# Patient Record
(demographics unavailable — no encounter records)

---

## 2024-09-29 NOTE — HISTORY OF PRESENT ILLNESS
[de-identified] : 52 year old AJ  referred by Dr Cruz, for  evaluation s/p RIGHT nipple sparing mastectomy, R SLNB on 9/6/24 of Right ILC ER 95%, HI 90%, HER2- and LCIS;  8/9/24 ([facility]) B/L DX MMG/US: extremely dense. R 10:00 5cmfn posterior depth a new 1cm area of vague distortion corresponding to an irregular hypoechoic 9mm mass on sonogram, associated with amorphous calcifications. R 12:00 5cmfn posterior depth a new 1cm area of vague distortion corresponding to an irregular hypoechoic 11mm mass on sonogram. Distance between the 2 areas is approx 15mm. Follow Up: US-guided biopsy x2 sites right breast. BI-RADS 4, suspicious.  8/13/24 ([facility]) R US-guided biopsy x2 sites: 1. Right 12:00 5cmfn (Vision clip): Infiltrating lobular carcinoma, moderately differentiated, 11 mm in greatest dimension. ER/HI and HER2 IHC are pending. Lobular carcinoma in situ. 2. Right 10:00 5cmfn (Ring clip)- Fibrotic breast containing lobular carcinoma in situ. The results are concordant and malignant. Follow Up: surgical or oncological management.  8/19/24 ([facility]) B/L MRI: heterogeneously dense. The breast is rotated laterally. Three findings in the right breast: 1. Right breast, superior breast, projecting slightly medially (corresponds to the 12:00 (5N)(VISION)(ILC + LCIS) located on the initial mammogram and ultrasound) is clip artifact within a 18 mm area of nonmass enhancement. Biopsy showed ILC = LCIS 2. Right breast, superior breast, projecting slightly lateral (5N)(RING) there is clip artifact within a 10 mm area of nonmass enhancement. Biopsy showed LCIS. Discordant. Recommend surgical removal. 3. Right breast, superior (8N), there is 1 cm of nonmass enhancement and distortion, Sag 5-185, axial 22625-739. This area is located superior to the known LCIS and ILC. Recommend MRI biopsy. RECOMMENDATION: MR guided biopsy. BI-RADS 4B - Moderately Suspicious  8/23/24 ([facility]) R MR guided biopsy: R superior posterior 8cmfn (stoplight): Lobular carcinoma in situ (LCIS), florid and classic types. Columnar cell change. Calcifications identified in benign epithelium. Concordant. Follow Up: continued surgical management of bx-proven ILC/LCIS and LCIS/Discordant finding.  9/6/24 ([facility]) RIGHT NIPPLE SPARING MASTECTOMY: 11mm ILC with alveolar features, 4 mm from closest inked anterior margin; LCIS with associated calcifications; no lymphatic invasion is identified; at least 2 biopsy site changes; fibrocystic changes with associated calcifications; IDP RIGHT SENTINEL LYMPH NODES: One lymph node with a subcapsular lymphatic channel containing 3 isolated tumor cells RIGHT SENTINEL LYMPH NODES (0/3) SLN negative for tumor.   ORS 11       Menstrual Hx: has no history of hormone replacement treatment. age at menarche was 10. the last menstrual period was 8/13/2024. The cycles have been regular.   Prior pregnancies: G0 and P0 .   Fertility Treatments: No.   Birth Control Pill Use: No.   Breast Feeding History: N/A GYN BMD Colonoscopy   Patient denies family history of breast cancer. Denies famhx of ovarian cancer. [employer] genetic testing 8/16/24 negative for pathogenic mutations; VUS in BRIP1 gene.

## 2024-09-29 NOTE — HISTORY OF PRESENT ILLNESS
[de-identified] : 52 year old AJ  referred by Dr Cruz, for  evaluation s/p RIGHT nipple sparing mastectomy, R SLNB on 9/6/24 of Right ILC ER 95%, NE 90%, HER2- and LCIS;  8/9/24 ([facility]) B/L DX MMG/US: extremely dense. R 10:00 5cmfn posterior depth a new 1cm area of vague distortion corresponding to an irregular hypoechoic 9mm mass on sonogram, associated with amorphous calcifications. R 12:00 5cmfn posterior depth a new 1cm area of vague distortion corresponding to an irregular hypoechoic 11mm mass on sonogram. Distance between the 2 areas is approx 15mm. Follow Up: US-guided biopsy x2 sites right breast. BI-RADS 4, suspicious.  8/13/24 ([facility]) R US-guided biopsy x2 sites: 1. Right 12:00 5cmfn (Vision clip): Infiltrating lobular carcinoma, moderately differentiated, 11 mm in greatest dimension. ER/NE and HER2 IHC are pending. Lobular carcinoma in situ. 2. Right 10:00 5cmfn (Ring clip)- Fibrotic breast containing lobular carcinoma in situ. The results are concordant and malignant. Follow Up: surgical or oncological management.  8/19/24 ([facility]) B/L MRI: heterogeneously dense. The breast is rotated laterally. Three findings in the right breast: 1. Right breast, superior breast, projecting slightly medially (corresponds to the 12:00 (5N)(VISION)(ILC + LCIS) located on the initial mammogram and ultrasound) is clip artifact within a 18 mm area of nonmass enhancement. Biopsy showed ILC = LCIS 2. Right breast, superior breast, projecting slightly lateral (5N)(RING) there is clip artifact within a 10 mm area of nonmass enhancement. Biopsy showed LCIS. Discordant. Recommend surgical removal. 3. Right breast, superior (8N), there is 1 cm of nonmass enhancement and distortion, Sag 5-185, axial 42645-346. This area is located superior to the known LCIS and ILC. Recommend MRI biopsy. RECOMMENDATION: MR guided biopsy. BI-RADS 4B - Moderately Suspicious  8/23/24 ([facility]) R MR guided biopsy: R superior posterior 8cmfn (stoplight): Lobular carcinoma in situ (LCIS), florid and classic types. Columnar cell change. Calcifications identified in benign epithelium. Concordant. Follow Up: continued surgical management of bx-proven ILC/LCIS and LCIS/Discordant finding.  9/6/24 ([facility]) RIGHT NIPPLE SPARING MASTECTOMY: 11mm ILC with alveolar features, 4 mm from closest inked anterior margin; LCIS with associated calcifications; no lymphatic invasion is identified; at least 2 biopsy site changes; fibrocystic changes with associated calcifications; IDP RIGHT SENTINEL LYMPH NODES: One lymph node with a subcapsular lymphatic channel containing 3 isolated tumor cells RIGHT SENTINEL LYMPH NODES (0/3) SLN negative for tumor.   ORS 11       Menstrual Hx: has no history of hormone replacement treatment. age at menarche was 10. the last menstrual period was 8/13/2024. The cycles have been regular.   Prior pregnancies: G0 and P0 .   Fertility Treatments: No.   Birth Control Pill Use: No.   Breast Feeding History: N/A GYN BMD Colonoscopy   Patient denies family history of breast cancer. Denies famhx of ovarian cancer. [employer] genetic testing 8/16/24 negative for pathogenic mutations; VUS in BRIP1 gene.

## 2024-10-02 NOTE — HISTORY OF PRESENT ILLNESS
[FreeTextEntry1] : Tonia Soto is a 53 yo F with AJCC 8th Ed Stage IA (pT1cN0[i+]MX) RIGHT (breast quadrant: ie upper outer, lower inner) Breast ER+/AR+/Her2- breast ILC and LCIS s/p 9/6/24 RIGHT nipple sparing mastectomy, R SLNB (11mm, grade II, no LVI, margins negative,  nodes positive for isolated tumor cells, oncotype RS 11) who is referred by Dr. Cruz for discussion of radiation to the Right breast.    10/11/24: Consultations Has appt w/ Dr. Elliott on 10/8  History of Present Illness  _________________________________  Tonia Soto is a 53 yo F with no significant pmh who was found to have biopsy confirmed Right breast cancer.   8/9/24 ([facility]) B/L DX MMG/US: extremely dense. R 10:00 5cmfn posterior depth a new 1cm area of vague distortion corresponding to an irregular hypoechoic 9mm mass on sonogram, associated with amorphous calcifications. R 12:00 5cmfn posterior depth a new 1cm area of vague distortion corresponding to an irregular hypoechoic 11mm mass on sonogram. Distance between the 2 areas is approx 15mm. Follow Up: US-guided biopsy x2 sites right breast. BI-RADS 4, suspicious.   8/13/24 ([facility]) R US-guided biopsy x2 sites:  1. Right 12:00 5cmfn (Vision clip): Infiltrating lobular carcinoma, moderately differentiated, 11 mm in greatest dimension. ER/AR and HER2 IHC are pending. Lobular carcinoma in situ.  2. Right 10:00 5cmfn (Ring clip)- Fibrotic breast containing lobular carcinoma in situ.  The results are concordant and malignant. Follow Up: surgical or oncological management.   8/19/24 ([facility]) B/L MRI: heterogeneously dense. The breast is rotated laterally. Three findings in the right breast:  1. Right breast, superior breast, projecting slightly medially (corresponds to the 12:00 (5N)(VISION)(ILC + LCIS) located on the initial mammogram and ultrasound) is clip artifact within a 18 mm area of nonmass enhancement. Biopsy showed ILC = LCIS  2. Right breast, superior breast, projecting slightly lateral (5N)(RING) there is clip artifact within a 10 mm area of nonmass enhancement. Biopsy showed LCIS. Discordant. Recommend surgical removal.  3. Right breast, superior (8N), there is 1 cm of nonmass enhancement and distortion, Sag 5-185, axial 37047-393. This area is located superior to the known LCIS and ILC. Recommend MRI biopsy.  RECOMMENDATION: MR guided biopsy. BI-RADS 4B - Moderately Suspicious   8/23/24 ([facility]) R MR guided biopsy: R superior posterior 8cmfn (stoplight): Lobular carcinoma in situ (LCIS), florid and classic types. Columnar cell change. Calcifications identified in benign epithelium. Concordant. Follow Up: continued surgical management of bx-proven ILC/LCIS and LCIS/Discordant finding.   9/6/24 ([facility])  RIGHT NIPPLE SPARING MASTECTOMY: 11mm ILC with alveolar features, 4 mm from closest inked anterior margin; LCIS with associated calcifications; no lymphatic invasion is identified; at least 2 biopsy site changes; fibrocystic changes with associated calcifications; IDP  RIGHT SENTINEL LYMPH NODES: One lymph node with a subcapsular lymphatic channel containing 3 isolated tumor cells  RIGHT SENTINEL LYMPH NODES (0/3) SLN negative for tumor.

## 2024-10-02 NOTE — PAST MEDICAL HISTORY
[Menarche Age ____] : age at menarche was [unfilled] [History of Hormone Replacement Treatment] : has no history of hormone replacement treatment [Definite ___ (Date)] : the last menstrual period was [unfilled] [Regular Cycle Intervals] : have been regular [Total Preg ___] : G[unfilled] [Live Births ___] : P[unfilled]  [FreeTextEntry6] : No [FreeTextEntry7] : No [FreeTextEntry8] : N/A

## 2024-10-10 NOTE — ASSESSMENT
[FreeTextEntry1] : 51 yo AJ, My risk VUS BRIP1, perimenopause S/P Rt  NSP mastectomy  with TE and SLN -11mm ILC with alveolar features,  ER/NH pos, her 2 neg, LCIS with associated calcifications; no lymphatic invasion is identified; One lymph node with a subcapsular lymphatic channel containing 3 isolated tumor cells, (0/3) SLN negative for tumor..ORS 11 Reviewed pathology and prognosis. Reviewed ORS  no chemo benefitsThe patient is a good candidate for endocrine therapy to reduce the risk of recurrence. My recommendation is TAMOXIFEN 20 mg PO daily fsince permenopausal    We discussed potential side effects of TAMOXIFEN, including but not limited to menopause hot flashes, blood clots and endometrial cancer, cataracts, improvement BMD and chol. Will consider change to  when in menopause  The patient understands the potential benefits, risks and alternatives. She agrees to take tamoxifen in the adjuvant setting.     Discussed risk distant recurrence, local and contralateral breast ca. Discussed finding of 3 cells in one SLN - possible "traumet". Would not change treatment plan. Baseline pelvic US and BMD. Reviewed diet and exercise All of the patient's questions were adequately addressed and answered during today's consultation. Our discussion covered a range of topics including diagnosis, prognosis, treatment options, potential risks and alternatives, as well as overall care. The patient expressed agreement with the proposed plan. She was instructed to reach out to me if she had any further questions or if there were any changes in her clinical condition.

## 2024-10-10 NOTE — PHYSICAL EXAM
[Restricted in physically strenuous activity but ambulatory and able to carry out work of a light or sedentary nature] : Status 1- Restricted in physically strenuous activity but ambulatory and able to carry out work of a light or sedentary nature, e.g., light house work, office work [Normal] : affect appropriate [de-identified] : s/p Rt NSP mastectomy with TE, Lt unremarkable - no palp LN

## 2024-10-10 NOTE — PHYSICAL EXAM
[Restricted in physically strenuous activity but ambulatory and able to carry out work of a light or sedentary nature] : Status 1- Restricted in physically strenuous activity but ambulatory and able to carry out work of a light or sedentary nature, e.g., light house work, office work [Normal] : affect appropriate [de-identified] : s/p Rt NSP mastectomy with TE, Lt unremarkable - no palp LN

## 2024-10-10 NOTE — ASSESSMENT
[FreeTextEntry1] : 51 yo AJ, My risk VUS BRIP1, perimenopause S/P Rt  NSP mastectomy  with TE and SLN -11mm ILC with alveolar features,  ER/ME pos, her 2 neg, LCIS with associated calcifications; no lymphatic invasion is identified; One lymph node with a subcapsular lymphatic channel containing 3 isolated tumor cells, (0/3) SLN negative for tumor..ORS 11 Reviewed pathology and prognosis. Reviewed ORS  no chemo benefitsThe patient is a good candidate for endocrine therapy to reduce the risk of recurrence. My recommendation is TAMOXIFEN 20 mg PO daily fsince permenopausal    We discussed potential side effects of TAMOXIFEN, including but not limited to menopause hot flashes, blood clots and endometrial cancer, cataracts, improvement BMD and chol. Will consider change to  when in menopause  The patient understands the potential benefits, risks and alternatives. She agrees to take tamoxifen in the adjuvant setting.     Discussed risk distant recurrence, local and contralateral breast ca. Discussed finding of 3 cells in one SLN - possible "traumet". Would not change treatment plan. Baseline pelvic US and BMD. Reviewed diet and exercise All of the patient's questions were adequately addressed and answered during today's consultation. Our discussion covered a range of topics including diagnosis, prognosis, treatment options, potential risks and alternatives, as well as overall care. The patient expressed agreement with the proposed plan. She was instructed to reach out to me if she had any further questions or if there were any changes in her clinical condition.

## 2024-10-10 NOTE — HISTORY OF PRESENT ILLNESS
[de-identified] : 52 year old AJ  referred by Dr Cruz, for  evaluation s/p RIGHT nipple sparing mastectomy, R SLNB on 9/6/24 of Right ILC ER 95%, MI 90%, HER2- and LCIS; Presents with siter Zainab  8/9/24 ([facility]) B/L DX MMG/US: extremely dense. R 10:00 5cmfn posterior depth a new 1cm area of vague distortion corresponding to an irregular hypoechoic 9mm mass on sonogram, associated with amorphous calcifications. R 12:00 5cmfn posterior depth a new 1cm area of vague distortion corresponding to an irregular hypoechoic 11mm mass on sonogram. Distance between the 2 areas is approx 15mm. Follow Up: US-guided biopsy x2 sites right breast. BI-RADS 4, suspicious.  8/13/24 ([facility]) R US-guided biopsy x2 sites: 1. Right 12:00 5cmfn (Vision clip): Infiltrating lobular carcinoma, moderately differentiated, 11 mm in greatest dimension. ER/MI and HER2 IHC are pending. Lobular carcinoma in situ. 2. Right 10:00 5cmfn (Ring clip)- Fibrotic breast containing lobular carcinoma in situ. The results are concordant and malignant. Follow Up: surgical or oncological management.  ER 95%, MI 95%, her 2 neg  8/19/24 ([facility]) B/L MRI: heterogeneously dense. The breast is rotated laterally. Three findings in the right breast: 1. Right breast, superior breast, projecting slightly medially (corresponds to the 12:00 (5N)(VISION)(ILC + LCIS) located on the initial mammogram and ultrasound) is clip artifact within a 18 mm area of nonmass enhancement. Biopsy showed ILC = LCIS 2. Right breast, superior breast, projecting slightly lateral (5N)(RING) there is clip artifact within a 10 mm area of nonmass enhancement. Biopsy showed LCIS. Discordant. Recommend surgical removal. 3. Right breast, superior (8N), there is 1 cm of nonmass enhancement and distortion, Sag 5-185, axial 77576-922. This area is located superior to the known LCIS and ILC. Recommend MRI biopsy. RECOMMENDATION: MR guided biopsy. BI-RADS 4B - Moderately Suspicious  8/23/24 ([facility]) R MR guided biopsy: R superior posterior 8cmfn (stoplight): Lobular carcinoma in situ (LCIS), florid and classic types. Columnar cell change. Calcifications identified in benign epithelium. Concordant. Follow Up: continued surgical management of bx-proven ILC/LCIS and LCIS/Discordant finding.  9/6/24 ([facility]) RIGHT NIPPLE SPARING MASTECTOMY: 11mm ILC with alveolar features, 4 mm from closest inked anterior margin; LCIS with associated calcifications; no lymphatic invasion is identified; at least 2 biopsy site changes; fibrocystic changes with associated calcifications; IDP RIGHT SENTINEL LYMPH NODES: One lymph node with a subcapsular lymphatic channel containing 3 isolated tumor cells RIGHT SENTINEL LYMPH NODES (0/3) SLN negative for tumor.  T1cN0 (i+) ORS 11   Menstrual Hx: has no history of hormone replacement treatment. age at menarche was 10-11. LMP 9/10/24. The cycles have been regular.   Prior pregnancies: G0 and P0 .   Fertility Treatments: No.   Birth Control Pill Use: 16-47.   Breast Feeding History: N/A GYN 9/24/24 BMD never Colonoscopy never No significant med illness age 4 adenoids Lipoma thing L 7/23   Patient denies family history of breast cancer. Denies fam hx of ovarian cancer, prostate, pancreatic. Father AML 63 [employer] genetic testing 8/16/24 negative for pathogenic mutations; VUS in BRIP1 gene.  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 Occ Etoh, not sexually active, exercises regularly

## 2024-10-10 NOTE — ASSESSMENT
[FreeTextEntry1] : 51 yo AJ, My risk VUS BRIP1, perimenopause S/P Rt  NSP mastectomy  with TE and SLN -11mm ILC with alveolar features,  ER/MA pos, her 2 neg, LCIS with associated calcifications; no lymphatic invasion is identified; One lymph node with a subcapsular lymphatic channel containing 3 isolated tumor cells, (0/3) SLN negative for tumor..ORS 11 Reviewed pathology and prognosis. Reviewed ORS  no chemo benefitsThe patient is a good candidate for endocrine therapy to reduce the risk of recurrence. My recommendation is TAMOXIFEN 20 mg PO daily fsince permenopausal    We discussed potential side effects of TAMOXIFEN, including but not limited to menopause hot flashes, blood clots and endometrial cancer, cataracts, improvement BMD and chol. Will consider change to  when in menopause  The patient understands the potential benefits, risks and alternatives. She agrees to take tamoxifen in the adjuvant setting.     Discussed risk distant recurrence, local and contralateral breast ca. Discussed finding of 3 cells in one SLN - possible "traumet". Would not change treatment plan. Baseline pelvic US and BMD. Reviewed diet and exercise All of the patient's questions were adequately addressed and answered during today's consultation. Our discussion covered a range of topics including diagnosis, prognosis, treatment options, potential risks and alternatives, as well as overall care. The patient expressed agreement with the proposed plan. She was instructed to reach out to me if she had any further questions or if there were any changes in her clinical condition.

## 2024-10-10 NOTE — PHYSICAL EXAM
[Restricted in physically strenuous activity but ambulatory and able to carry out work of a light or sedentary nature] : Status 1- Restricted in physically strenuous activity but ambulatory and able to carry out work of a light or sedentary nature, e.g., light house work, office work [Normal] : affect appropriate [de-identified] : s/p Rt NSP mastectomy with TE, Lt unremarkable - no palp LN

## 2024-10-10 NOTE — HISTORY OF PRESENT ILLNESS
[de-identified] : 52 year old AJ  referred by Dr Cruz, for  evaluation s/p RIGHT nipple sparing mastectomy, R SLNB on 9/6/24 of Right ILC ER 95%, MD 90%, HER2- and LCIS; Presents with siter Zainab  8/9/24 ([facility]) B/L DX MMG/US: extremely dense. R 10:00 5cmfn posterior depth a new 1cm area of vague distortion corresponding to an irregular hypoechoic 9mm mass on sonogram, associated with amorphous calcifications. R 12:00 5cmfn posterior depth a new 1cm area of vague distortion corresponding to an irregular hypoechoic 11mm mass on sonogram. Distance between the 2 areas is approx 15mm. Follow Up: US-guided biopsy x2 sites right breast. BI-RADS 4, suspicious.  8/13/24 ([facility]) R US-guided biopsy x2 sites: 1. Right 12:00 5cmfn (Vision clip): Infiltrating lobular carcinoma, moderately differentiated, 11 mm in greatest dimension. ER/MD and HER2 IHC are pending. Lobular carcinoma in situ. 2. Right 10:00 5cmfn (Ring clip)- Fibrotic breast containing lobular carcinoma in situ. The results are concordant and malignant. Follow Up: surgical or oncological management.  ER 95%, MD 95%, her 2 neg  8/19/24 ([facility]) B/L MRI: heterogeneously dense. The breast is rotated laterally. Three findings in the right breast: 1. Right breast, superior breast, projecting slightly medially (corresponds to the 12:00 (5N)(VISION)(ILC + LCIS) located on the initial mammogram and ultrasound) is clip artifact within a 18 mm area of nonmass enhancement. Biopsy showed ILC = LCIS 2. Right breast, superior breast, projecting slightly lateral (5N)(RING) there is clip artifact within a 10 mm area of nonmass enhancement. Biopsy showed LCIS. Discordant. Recommend surgical removal. 3. Right breast, superior (8N), there is 1 cm of nonmass enhancement and distortion, Sag 5-185, axial 65956-766. This area is located superior to the known LCIS and ILC. Recommend MRI biopsy. RECOMMENDATION: MR guided biopsy. BI-RADS 4B - Moderately Suspicious  8/23/24 ([facility]) R MR guided biopsy: R superior posterior 8cmfn (stoplight): Lobular carcinoma in situ (LCIS), florid and classic types. Columnar cell change. Calcifications identified in benign epithelium. Concordant. Follow Up: continued surgical management of bx-proven ILC/LCIS and LCIS/Discordant finding.  9/6/24 ([facility]) RIGHT NIPPLE SPARING MASTECTOMY: 11mm ILC with alveolar features, 4 mm from closest inked anterior margin; LCIS with associated calcifications; no lymphatic invasion is identified; at least 2 biopsy site changes; fibrocystic changes with associated calcifications; IDP RIGHT SENTINEL LYMPH NODES: One lymph node with a subcapsular lymphatic channel containing 3 isolated tumor cells RIGHT SENTINEL LYMPH NODES (0/3) SLN negative for tumor.  T1cN0 (i+) ORS 11   Menstrual Hx: has no history of hormone replacement treatment. age at menarche was 10-11. LMP 9/10/24. The cycles have been regular.   Prior pregnancies: G0 and P0 .   Fertility Treatments: No.   Birth Control Pill Use: 16-47.   Breast Feeding History: N/A GYN 9/24/24 BMD never Colonoscopy never No significant med illness age 4 adenoids Lipoma thing L 7/23   Patient denies family history of breast cancer. Denies fam hx of ovarian cancer, prostate, pancreatic. Father AML 63 [employer] genetic testing 8/16/24 negative for pathogenic mutations; VUS in BRIP1 gene.  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 Occ Etoh, not sexually active, exercises regularly

## 2024-10-10 NOTE — HISTORY OF PRESENT ILLNESS
[de-identified] : 52 year old AJ  referred by Dr Cruz, for  evaluation s/p RIGHT nipple sparing mastectomy, R SLNB on 9/6/24 of Right ILC ER 95%, ID 90%, HER2- and LCIS; Presents with siter Zainab  8/9/24 ([facility]) B/L DX MMG/US: extremely dense. R 10:00 5cmfn posterior depth a new 1cm area of vague distortion corresponding to an irregular hypoechoic 9mm mass on sonogram, associated with amorphous calcifications. R 12:00 5cmfn posterior depth a new 1cm area of vague distortion corresponding to an irregular hypoechoic 11mm mass on sonogram. Distance between the 2 areas is approx 15mm. Follow Up: US-guided biopsy x2 sites right breast. BI-RADS 4, suspicious.  8/13/24 ([facility]) R US-guided biopsy x2 sites: 1. Right 12:00 5cmfn (Vision clip): Infiltrating lobular carcinoma, moderately differentiated, 11 mm in greatest dimension. ER/ID and HER2 IHC are pending. Lobular carcinoma in situ. 2. Right 10:00 5cmfn (Ring clip)- Fibrotic breast containing lobular carcinoma in situ. The results are concordant and malignant. Follow Up: surgical or oncological management.  ER 95%, ID 95%, her 2 neg  8/19/24 ([facility]) B/L MRI: heterogeneously dense. The breast is rotated laterally. Three findings in the right breast: 1. Right breast, superior breast, projecting slightly medially (corresponds to the 12:00 (5N)(VISION)(ILC + LCIS) located on the initial mammogram and ultrasound) is clip artifact within a 18 mm area of nonmass enhancement. Biopsy showed ILC = LCIS 2. Right breast, superior breast, projecting slightly lateral (5N)(RING) there is clip artifact within a 10 mm area of nonmass enhancement. Biopsy showed LCIS. Discordant. Recommend surgical removal. 3. Right breast, superior (8N), there is 1 cm of nonmass enhancement and distortion, Sag 5-185, axial 07032-232. This area is located superior to the known LCIS and ILC. Recommend MRI biopsy. RECOMMENDATION: MR guided biopsy. BI-RADS 4B - Moderately Suspicious  8/23/24 ([facility]) R MR guided biopsy: R superior posterior 8cmfn (stoplight): Lobular carcinoma in situ (LCIS), florid and classic types. Columnar cell change. Calcifications identified in benign epithelium. Concordant. Follow Up: continued surgical management of bx-proven ILC/LCIS and LCIS/Discordant finding.  9/6/24 ([facility]) RIGHT NIPPLE SPARING MASTECTOMY: 11mm ILC with alveolar features, 4 mm from closest inked anterior margin; LCIS with associated calcifications; no lymphatic invasion is identified; at least 2 biopsy site changes; fibrocystic changes with associated calcifications; IDP RIGHT SENTINEL LYMPH NODES: One lymph node with a subcapsular lymphatic channel containing 3 isolated tumor cells RIGHT SENTINEL LYMPH NODES (0/3) SLN negative for tumor.  T1cN0 (i+) ORS 11   Menstrual Hx: has no history of hormone replacement treatment. age at menarche was 10-11. LMP 9/10/24. The cycles have been regular.   Prior pregnancies: G0 and P0 .   Fertility Treatments: No.   Birth Control Pill Use: 16-47.   Breast Feeding History: N/A GYN 9/24/24 BMD never Colonoscopy never No significant med illness age 4 adenoids Lipoma thing L 7/23   Patient denies family history of breast cancer. Denies fam hx of ovarian cancer, prostate, pancreatic. Father AML 63 [facility] genetic testing 8/16/24 negative for pathogenic mutations; VUS in BRIP1 gene.  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 Occ Etoh, not sexually active, exercises regularly

## 2024-10-10 NOTE — HISTORY OF PRESENT ILLNESS
[de-identified] : 52 year old AJ  referred by Dr Cruz, for  evaluation s/p RIGHT nipple sparing mastectomy, R SLNB on 9/6/24 of Right ILC ER 95%, OR 90%, HER2- and LCIS; Presents with siter Zainab  8/9/24 ([facility]) B/L DX MMG/US: extremely dense. R 10:00 5cmfn posterior depth a new 1cm area of vague distortion corresponding to an irregular hypoechoic 9mm mass on sonogram, associated with amorphous calcifications. R 12:00 5cmfn posterior depth a new 1cm area of vague distortion corresponding to an irregular hypoechoic 11mm mass on sonogram. Distance between the 2 areas is approx 15mm. Follow Up: US-guided biopsy x2 sites right breast. BI-RADS 4, suspicious.  8/13/24 ([facility]) R US-guided biopsy x2 sites: 1. Right 12:00 5cmfn (Vision clip): Infiltrating lobular carcinoma, moderately differentiated, 11 mm in greatest dimension. ER/OR and HER2 IHC are pending. Lobular carcinoma in situ. 2. Right 10:00 5cmfn (Ring clip)- Fibrotic breast containing lobular carcinoma in situ. The results are concordant and malignant. Follow Up: surgical or oncological management.  ER 95%, OR 95%, her 2 neg  8/19/24 ([facility]) B/L MRI: heterogeneously dense. The breast is rotated laterally. Three findings in the right breast: 1. Right breast, superior breast, projecting slightly medially (corresponds to the 12:00 (5N)(VISION)(ILC + LCIS) located on the initial mammogram and ultrasound) is clip artifact within a 18 mm area of nonmass enhancement. Biopsy showed ILC = LCIS 2. Right breast, superior breast, projecting slightly lateral (5N)(RING) there is clip artifact within a 10 mm area of nonmass enhancement. Biopsy showed LCIS. Discordant. Recommend surgical removal. 3. Right breast, superior (8N), there is 1 cm of nonmass enhancement and distortion, Sag 5-185, axial 13623-623. This area is located superior to the known LCIS and ILC. Recommend MRI biopsy. RECOMMENDATION: MR guided biopsy. BI-RADS 4B - Moderately Suspicious  8/23/24 ([facility]) R MR guided biopsy: R superior posterior 8cmfn (stoplight): Lobular carcinoma in situ (LCIS), florid and classic types. Columnar cell change. Calcifications identified in benign epithelium. Concordant. Follow Up: continued surgical management of bx-proven ILC/LCIS and LCIS/Discordant finding.  9/6/24 ([facility]) RIGHT NIPPLE SPARING MASTECTOMY: 11mm ILC with alveolar features, 4 mm from closest inked anterior margin; LCIS with associated calcifications; no lymphatic invasion is identified; at least 2 biopsy site changes; fibrocystic changes with associated calcifications; IDP RIGHT SENTINEL LYMPH NODES: One lymph node with a subcapsular lymphatic channel containing 3 isolated tumor cells RIGHT SENTINEL LYMPH NODES (0/3) SLN negative for tumor.  T1cN0 (i+) ORS 11   Menstrual Hx: has no history of hormone replacement treatment. age at menarche was 10-11. LMP 9/10/24. The cycles have been regular.   Prior pregnancies: G0 and P0 .   Fertility Treatments: No.   Birth Control Pill Use: 16-47.   Breast Feeding History: N/A GYN 9/24/24 BMD never Colonoscopy never No significant med illness age 4 adenoids Lipoma thing L 7/23   Patient denies family history of breast cancer. Denies fam hx of ovarian cancer, prostate, pancreatic. Father AML 63 [facility] MD genetic testing 8/16/24 negative for pathogenic mutations; VUS in BRIP1 gene.  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 Occ Etoh, not sexually active, exercises regularly

## 2024-10-10 NOTE — PHYSICAL EXAM
[Restricted in physically strenuous activity but ambulatory and able to carry out work of a light or sedentary nature] : Status 1- Restricted in physically strenuous activity but ambulatory and able to carry out work of a light or sedentary nature, e.g., light house work, office work [Normal] : affect appropriate [de-identified] : s/p Rt NSP mastectomy with TE, Lt unremarkable - no palp LN

## 2024-10-10 NOTE — ASSESSMENT
[FreeTextEntry1] : 51 yo AJ, My risk VUS BRIP1, perimenopause S/P Rt  NSP mastectomy  with TE and SLN -11mm ILC with alveolar features,  ER/HI pos, her 2 neg, LCIS with associated calcifications; no lymphatic invasion is identified; One lymph node with a subcapsular lymphatic channel containing 3 isolated tumor cells, (0/3) SLN negative for tumor..ORS 11 Reviewed pathology and prognosis. Reviewed ORS  no chemo benefitsThe patient is a good candidate for endocrine therapy to reduce the risk of recurrence. My recommendation is TAMOXIFEN 20 mg PO daily fsince permenopausal    We discussed potential side effects of TAMOXIFEN, including but not limited to menopause hot flashes, blood clots and endometrial cancer, cataracts, improvement BMD and chol. Will consider change to  when in menopause  The patient understands the potential benefits, risks and alternatives. She agrees to take tamoxifen in the adjuvant setting.     Discussed risk distant recurrence, local and contralateral breast ca. Discussed finding of 3 cells in one SLN - possible "traumet". Would not change treatment plan. Baseline pelvic US and BMD. Reviewed diet and exercise All of the patient's questions were adequately addressed and answered during today's consultation. Our discussion covered a range of topics including diagnosis, prognosis, treatment options, potential risks and alternatives, as well as overall care. The patient expressed agreement with the proposed plan. She was instructed to reach out to me if she had any further questions or if there were any changes in her clinical condition.

## 2024-10-17 NOTE — HISTORY OF PRESENT ILLNESS
[FreeTextEntry1] : Tonia Soto is a 51 yo F with AJCC 8th Ed Stage IA (pT1cN0[i+]MX) RIGHT UOQ Breast ER+/TN+/Her2- breast ILC and LCIS s/p 9/6/24 RIGHT nipple sparing mastectomy, R SLNB (11mm, grade II, no LVI, margins negative, nodes positive for isolated tumor cells, oncotype RS 11) who is referred by Dr. Cruz for discussion of radiation to the Right breast.    10/11/24: Consultation. Today she is doing well with no complaints. Has appt w/ Dr. Elliott on 10/8.  History of Present Illness  _________________________________  Tonia Soto is a 51 yo F with no significant pmh who was found to have biopsy confirmed Right breast cancer.   8/9/24 ([facility]) B/L DX MMG/US: extremely dense. R 10:00 5cmfn posterior depth a new 1cm area of vague distortion corresponding to an irregular hypoechoic 9mm mass on sonogram, associated with amorphous calcifications. R 12:00 5cmfn posterior depth a new 1cm area of vague distortion corresponding to an irregular hypoechoic 11mm mass on sonogram. Distance between the 2 areas is approx 15mm. Follow Up: US-guided biopsy x2 sites right breast. BI-RADS 4, suspicious.   8/13/24 ([facility]) R US-guided biopsy x2 sites:  1. Right 12:00 5cmfn (Vision clip): Infiltrating lobular carcinoma, moderately differentiated, 11 mm in greatest dimension. ER/TN and HER2 IHC are pending. Lobular carcinoma in situ.  2. Right 10:00 5cmfn (Ring clip)- Fibrotic breast containing lobular carcinoma in situ.  The results are concordant and malignant. Follow Up: surgical or oncological management.   8/19/24 ([facility]) B/L MRI: heterogeneously dense. The breast is rotated laterally. Three findings in the right breast:  1. Right breast, superior breast, projecting slightly medially (corresponds to the 12:00 (5N)(VISION)(ILC + LCIS) located on the initial mammogram and ultrasound) is clip artifact within a 18 mm area of nonmass enhancement. Biopsy showed ILC = LCIS  2. Right breast, superior breast, projecting slightly lateral (5N)(RING) there is clip artifact within a 10 mm area of nonmass enhancement. Biopsy showed LCIS. Discordant. Recommend surgical removal.  3. Right breast, superior (8N), there is 1 cm of nonmass enhancement and distortion, Sag 5-185, axial 87278-700. This area is located superior to the known LCIS and ILC. Recommend MRI biopsy.  RECOMMENDATION: MR guided biopsy. BI-RADS 4B - Moderately Suspicious   8/23/24 ([facility]) R MR guided biopsy: R superior posterior 8cmfn (stoplight): Lobular carcinoma in situ (LCIS), florid and classic types. Columnar cell change. Calcifications identified in benign epithelium. Concordant. Follow Up: continued surgical management of bx-proven ILC/LCIS and LCIS/Discordant finding.   9/6/24 ([facility])  RIGHT NIPPLE SPARING MASTECTOMY: 11mm ILC with alveolar features, 4 mm from closest inked anterior margin; LCIS with associated calcifications; no lymphatic invasion is identified; at least 2 biopsy site changes; fibrocystic changes with associated calcifications; IDP  RIGHT SENTINEL LYMPH NODES: One lymph node with a subcapsular lymphatic channel containing 3 isolated tumor cells  RIGHT SENTINEL LYMPH NODES (0/3) SLN negative for tumor.

## 2024-10-17 NOTE — HISTORY OF PRESENT ILLNESS
[FreeTextEntry1] : Tonia Soto is a 53 yo F with AJCC 8th Ed Stage IA (pT1cN0[i+]MX) RIGHT UOQ Breast ER+/CO+/Her2- breast ILC and LCIS s/p 9/6/24 RIGHT nipple sparing mastectomy, R SLNB (11mm, grade II, no LVI, margins negative, nodes positive for isolated tumor cells, oncotype RS 11) who is referred by Dr. Cruz for discussion of radiation to the Right breast.    10/11/24: Consultation. Today she is doing well with no complaints. Has appt w/ Dr. Elliott on 10/8.  History of Present Illness  _________________________________  Tonia Soto is a 53 yo F with no significant pmh who was found to have biopsy confirmed Right breast cancer.   8/9/24 ([facility]) B/L DX MMG/US: extremely dense. R 10:00 5cmfn posterior depth a new 1cm area of vague distortion corresponding to an irregular hypoechoic 9mm mass on sonogram, associated with amorphous calcifications. R 12:00 5cmfn posterior depth a new 1cm area of vague distortion corresponding to an irregular hypoechoic 11mm mass on sonogram. Distance between the 2 areas is approx 15mm. Follow Up: US-guided biopsy x2 sites right breast. BI-RADS 4, suspicious.   8/13/24 ([facility]) R US-guided biopsy x2 sites:  1. Right 12:00 5cmfn (Vision clip): Infiltrating lobular carcinoma, moderately differentiated, 11 mm in greatest dimension. ER/CO and HER2 IHC are pending. Lobular carcinoma in situ.  2. Right 10:00 5cmfn (Ring clip)- Fibrotic breast containing lobular carcinoma in situ.  The results are concordant and malignant. Follow Up: surgical or oncological management.   8/19/24 ([facility]) B/L MRI: heterogeneously dense. The breast is rotated laterally. Three findings in the right breast:  1. Right breast, superior breast, projecting slightly medially (corresponds to the 12:00 (5N)(VISION)(ILC + LCIS) located on the initial mammogram and ultrasound) is clip artifact within a 18 mm area of nonmass enhancement. Biopsy showed ILC = LCIS  2. Right breast, superior breast, projecting slightly lateral (5N)(RING) there is clip artifact within a 10 mm area of nonmass enhancement. Biopsy showed LCIS. Discordant. Recommend surgical removal.  3. Right breast, superior (8N), there is 1 cm of nonmass enhancement and distortion, Sag 5-185, axial 52841-493. This area is located superior to the known LCIS and ILC. Recommend MRI biopsy.  RECOMMENDATION: MR guided biopsy. BI-RADS 4B - Moderately Suspicious   8/23/24 ([facility]) R MR guided biopsy: R superior posterior 8cmfn (stoplight): Lobular carcinoma in situ (LCIS), florid and classic types. Columnar cell change. Calcifications identified in benign epithelium. Concordant. Follow Up: continued surgical management of bx-proven ILC/LCIS and LCIS/Discordant finding.   9/6/24 ([facility])  RIGHT NIPPLE SPARING MASTECTOMY: 11mm ILC with alveolar features, 4 mm from closest inked anterior margin; LCIS with associated calcifications; no lymphatic invasion is identified; at least 2 biopsy site changes; fibrocystic changes with associated calcifications; IDP  RIGHT SENTINEL LYMPH NODES: One lymph node with a subcapsular lymphatic channel containing 3 isolated tumor cells  RIGHT SENTINEL LYMPH NODES (0/3) SLN negative for tumor.

## 2024-10-17 NOTE — PHYSICAL EXAM
[Supraclavicular Lymph Nodes Enlarged Bilaterally] : supraclavicular [Axillary Lymph Nodes Enlarged Bilaterally] : axillary [de-identified] : Well-healed mastectomy. No masses or lesions

## 2024-10-17 NOTE — VITALS
[Date: ____________] : Patient's last distress assessment performed on [unfilled]. [Maximal Pain Intensity: 0/10] : 0/10 [Least Pain Intensity: 0/10] : 0/10 [90: Able to carry normal activity; minor signs or symptoms of disease.] : 90: Able to carry normal activity; minor signs or symptoms of disease.  [ECOG Performance Status: 0 - Fully active, able to carry on all pre-disease performance without restriction] : Performance Status: 0 - Fully active, able to carry on all pre-disease performance without restriction [3 - Distress Level] : Distress Level: 3

## 2024-10-17 NOTE — PHYSICAL EXAM
[Supraclavicular Lymph Nodes Enlarged Bilaterally] : supraclavicular [Axillary Lymph Nodes Enlarged Bilaterally] : axillary [de-identified] : Well-healed mastectomy. No masses or lesions

## 2024-10-17 NOTE — PAST MEDICAL HISTORY
[History of Hormone Replacement Treatment] : has no history of hormone replacement treatment [FreeTextEntry6] : No [FreeTextEntry7] : No [FreeTextEntry8] : N/A

## 2024-10-17 NOTE — HISTORY OF PRESENT ILLNESS
[FreeTextEntry1] : Tonia Soto is a 51 yo F with AJCC 8th Ed Stage IA (pT1cN0[i+]MX) RIGHT UOQ Breast ER+/NH+/Her2- breast ILC and LCIS s/p 9/6/24 RIGHT nipple sparing mastectomy, R SLNB (11mm, grade II, no LVI, margins negative, nodes positive for isolated tumor cells, oncotype RS 11) who is referred by Dr. Cruz for discussion of radiation to the Right breast.    10/11/24: Consultation. Today she is doing well with no complaints. Has appt w/ Dr. Elliott on 10/8.  History of Present Illness  _________________________________  Tonia Soto is a 51 yo F with no significant pmh who was found to have biopsy confirmed Right breast cancer.   8/9/24 ([facility]) B/L DX MMG/US: extremely dense. R 10:00 5cmfn posterior depth a new 1cm area of vague distortion corresponding to an irregular hypoechoic 9mm mass on sonogram, associated with amorphous calcifications. R 12:00 5cmfn posterior depth a new 1cm area of vague distortion corresponding to an irregular hypoechoic 11mm mass on sonogram. Distance between the 2 areas is approx 15mm. Follow Up: US-guided biopsy x2 sites right breast. BI-RADS 4, suspicious.   8/13/24 ([facility]) R US-guided biopsy x2 sites:  1. Right 12:00 5cmfn (Vision clip): Infiltrating lobular carcinoma, moderately differentiated, 11 mm in greatest dimension. ER/NH and HER2 IHC are pending. Lobular carcinoma in situ.  2. Right 10:00 5cmfn (Ring clip)- Fibrotic breast containing lobular carcinoma in situ.  The results are concordant and malignant. Follow Up: surgical or oncological management.   8/19/24 ([facility]) B/L MRI: heterogeneously dense. The breast is rotated laterally. Three findings in the right breast:  1. Right breast, superior breast, projecting slightly medially (corresponds to the 12:00 (5N)(VISION)(ILC + LCIS) located on the initial mammogram and ultrasound) is clip artifact within a 18 mm area of nonmass enhancement. Biopsy showed ILC = LCIS  2. Right breast, superior breast, projecting slightly lateral (5N)(RING) there is clip artifact within a 10 mm area of nonmass enhancement. Biopsy showed LCIS. Discordant. Recommend surgical removal.  3. Right breast, superior (8N), there is 1 cm of nonmass enhancement and distortion, Sag 5-185, axial 36431-190. This area is located superior to the known LCIS and ILC. Recommend MRI biopsy.  RECOMMENDATION: MR guided biopsy. BI-RADS 4B - Moderately Suspicious   8/23/24 ([facility]) R MR guided biopsy: R superior posterior 8cmfn (stoplight): Lobular carcinoma in situ (LCIS), florid and classic types. Columnar cell change. Calcifications identified in benign epithelium. Concordant. Follow Up: continued surgical management of bx-proven ILC/LCIS and LCIS/Discordant finding.   9/6/24 ([facility])  RIGHT NIPPLE SPARING MASTECTOMY: 11mm ILC with alveolar features, 4 mm from closest inked anterior margin; LCIS with associated calcifications; no lymphatic invasion is identified; at least 2 biopsy site changes; fibrocystic changes with associated calcifications; IDP  RIGHT SENTINEL LYMPH NODES: One lymph node with a subcapsular lymphatic channel containing 3 isolated tumor cells  RIGHT SENTINEL LYMPH NODES (0/3) SLN negative for tumor.

## 2024-10-17 NOTE — PHYSICAL EXAM
[Supraclavicular Lymph Nodes Enlarged Bilaterally] : supraclavicular [Axillary Lymph Nodes Enlarged Bilaterally] : axillary [de-identified] : Well-healed mastectomy. No masses or lesions

## 2024-10-17 NOTE — DISEASE MANAGEMENT
[Clinical] : TNM Stage: c [TTNM] : 1c [NTNM] : 0i+ [MTNM] : 0 [IA] : IA Apixaban/Eliquis - Compliance/Apixaban/Eliquis - Dietary Advice/Apixaban/Eliquis - Follow up monitoring/Apixaban/Eliquis - Potential for adverse drug reactions and interactions

## 2024-10-20 NOTE — PHYSICAL EXAM
[de-identified] : TE in good position, Incisions well healed, no collections or s/sx of infection

## 2024-10-20 NOTE — HISTORY OF PRESENT ILLNESS
[FreeTextEntry1] : 53 y/o female s/p right breast reconstruction with sub muscular T/E with Dr. Cruz on 09/06/2024. Denies any f/c/n/v. Patient is not taking any pain medication. Patient is applying Aquaphor to drain site. She has an appointment with heme onc today and rad onc next on 10/11. Has tumor cells involving the LN capsule but not the node proper - and will be consulting /discussing if XRT is necessary. She was seeing Nicolette for PT, but unable to continue due to insurance.

## 2024-10-20 NOTE — PHYSICAL EXAM
[de-identified] : TE in good position, Incisions well healed, no collections or s/sx of infection

## 2024-10-20 NOTE — PHYSICAL EXAM
[de-identified] : TE in good position, Incisions well healed, no collections or s/sx of infection

## 2024-10-20 NOTE — ASSESSMENT
[FreeTextEntry1] : 1 month right TE breast recon, doing well TE: 200/300 Continue sports bra Continue PT, referral to Dr. Livingston given  RTC in 1 month unless radiation is needed, if so, she will return next week to finish expansion

## 2024-10-20 NOTE — HISTORY OF PRESENT ILLNESS
[FreeTextEntry1] : 51 y/o female s/p right breast reconstruction with sub muscular T/E with Dr. Cruz on 09/06/2024. Denies any f/c/n/v. Patient is not taking any pain medication. Patient is applying Aquaphor to drain site. She has an appointment with heme onc today and rad onc next on 10/11. Has tumor cells involving the LN capsule but not the node proper - and will be consulting /discussing if XRT is necessary. She was seeing Nicolette for PT, but unable to continue due to insurance.

## 2024-11-14 NOTE — HISTORY OF PRESENT ILLNESS
[FreeTextEntry1] : 51 y/o female s/p right breast reconstruction with sub muscular T/E with Dr. Cruz on 09/06/2024. Denies any f/c/n/v. Patient is not taking any pain medication. Patient is applying Aquaphor to drain site. Has tumor cells involving the LN capsule but not the node proper - and will be not need radiation. She is doing PT once a week with Sarah.

## 2024-11-14 NOTE — PHYSICAL EXAM
[de-identified] : TE in good position, Incisions well healed, no collections or s/sx of infection

## 2024-11-14 NOTE — ASSESSMENT
[FreeTextEntry1] : 2 months right TE breast recon, doing well TE: 200/300 Continue sports bra Continue PT, massage right lateral chest We briefly discussed the possibility of a delayed stacked PAP flap reconstruction, she is not sure she wants to proceed but will think about it. RTC in 1 month for last TE fill

## 2024-11-14 NOTE — PHYSICAL EXAM
[de-identified] : TE in good position, Incisions well healed, no collections or s/sx of infection

## 2024-11-14 NOTE — HISTORY OF PRESENT ILLNESS
[FreeTextEntry1] : 53 y/o female s/p right breast reconstruction with sub muscular T/E with Dr. Cruz on 09/06/2024. Denies any f/c/n/v. Patient is not taking any pain medication. Patient is applying Aquaphor to drain site. Has tumor cells involving the LN capsule but not the node proper - and will be not need radiation. She is doing PT once a week with Sarah.

## 2024-11-14 NOTE — PHYSICAL EXAM
[de-identified] : TE in good position, Incisions well healed, no collections or s/sx of infection

## 2024-12-04 NOTE — PHYSICAL EXAM
[No Nipple Retraction] : no right nipple retraction [Asymmetrical] : asymmetrical [No Nipple Discharge] : no right nipple discharge [No Rashes] : no rashes [de-identified] : TE in place

## 2024-12-04 NOTE — FAMILY HISTORY
[TextEntry] : Father myeloid leukemia age 63 Denies any family history of breast, ovarian, pancreatic or prostate cancer.

## 2024-12-04 NOTE — DATA REVIEWED
[FreeTextEntry1] : 8/9/24 ([facility]) B/L DX MMG/US: extremely dense. R 10:00 5cmfn posterior depth a new 1cm area of vague distortion corresponding to an irregular hypoechoic 9mm mass on sonogram, associated with amorphous calcifications. R 12:00 5cmfn posterior depth a new 1cm area of vague distortion corresponding to an irregular hypoechoic 11mm mass on sonogram. Distance between the 2 areas is approx 15mm. Follow Up: US-guided biopsy x2 sites right breast. BI-RADS 4, suspicious.  8/13/24 ([facility]) R US-guided biopsy x2 sites: 1. Right 12:00 5cmfn (Vision clip): Infiltrating lobular carcinoma, moderately differentiated, 11 mm in greatest dimension. ER/AK and HER2 IHC are pending. Lobular carcinoma in situ. 2. Right 10:00 5cmfn (Ring clip)- Fibrotic breast containing lobular carcinoma in situ. The results are concordant and malignant. Follow Up: surgical or oncological management.  8/19/24 ([facility]) B/L MRI: heterogeneously dense. The breast is rotated laterally. Three findings in the right breast: 1. Right breast, superior breast, projecting slightly medially (corresponds to the 12:00 (5N)(VISION)(ILC + LCIS) located on the initial mammogram and ultrasound) is clip artifact within a 18 mm area of nonmass enhancement. Biopsy showed ILC = LCIS 2. Right breast, superior breast, projecting slightly lateral (5N)(RING) there is clip artifact within a 10 mm area of nonmass enhancement. Biopsy showed LCIS. Discordant. Recommend surgical removal. 3. Right breast, superior (8N), there is 1 cm of nonmass enhancement and distortion, Sag 5-185, axial 58315-073. This area is located superior to the known LCIS and ILC. Recommend MRI biopsy. RECOMMENDATION: MR guided biopsy. BI-RADS 4B - Moderately Suspicious  8/23/24 ([facility]) R MR guided biopsy: R superior posterior 8cmfn (stoplight): Lobular carcinoma in situ (LCIS), florid and classic types. Columnar cell change. Calcifications identified in benign epithelium. Concordant. Follow Up: continued surgical management of bx-proven ILC/LCIS and LCIS/Discordant finding.  9/6/24 ([facility])  RIGHT NIPPLE SPARING MASTECTOMY: 11mm ILC with alveolar features, 4 mm from closest inked anterior margin; LCIS with associated calcifications; no lymphatic invasion is identified; at least 2 biopsy site changes; fibrocystic changes with associated calcifications; IDP RIGHT SENTINEL LYMPH NODES: One lymph node with a subcapsular  lymphatic channel containing 3 isolated tumor cells RIGHT SENTINEL LYMPH NODES (0/3) SLN negative for tumor

## 2024-12-04 NOTE — PHYSICAL EXAM
[No Nipple Retraction] : no right nipple retraction [Asymmetrical] : asymmetrical [No Nipple Discharge] : no right nipple discharge [No Rashes] : no rashes [de-identified] : TE in place

## 2024-12-04 NOTE — DATA REVIEWED
[FreeTextEntry1] : 8/9/24 ([facility]) B/L DX MMG/US: extremely dense. R 10:00 5cmfn posterior depth a new 1cm area of vague distortion corresponding to an irregular hypoechoic 9mm mass on sonogram, associated with amorphous calcifications. R 12:00 5cmfn posterior depth a new 1cm area of vague distortion corresponding to an irregular hypoechoic 11mm mass on sonogram. Distance between the 2 areas is approx 15mm. Follow Up: US-guided biopsy x2 sites right breast. BI-RADS 4, suspicious.  8/13/24 ([facility]) R US-guided biopsy x2 sites: 1. Right 12:00 5cmfn (Vision clip): Infiltrating lobular carcinoma, moderately differentiated, 11 mm in greatest dimension. ER/HI and HER2 IHC are pending. Lobular carcinoma in situ. 2. Right 10:00 5cmfn (Ring clip)- Fibrotic breast containing lobular carcinoma in situ. The results are concordant and malignant. Follow Up: surgical or oncological management.  8/19/24 ([facility]) B/L MRI: heterogeneously dense. The breast is rotated laterally. Three findings in the right breast: 1. Right breast, superior breast, projecting slightly medially (corresponds to the 12:00 (5N)(VISION)(ILC + LCIS) located on the initial mammogram and ultrasound) is clip artifact within a 18 mm area of nonmass enhancement. Biopsy showed ILC = LCIS 2. Right breast, superior breast, projecting slightly lateral (5N)(RING) there is clip artifact within a 10 mm area of nonmass enhancement. Biopsy showed LCIS. Discordant. Recommend surgical removal. 3. Right breast, superior (8N), there is 1 cm of nonmass enhancement and distortion, Sag 5-185, axial 00392-461. This area is located superior to the known LCIS and ILC. Recommend MRI biopsy. RECOMMENDATION: MR guided biopsy. BI-RADS 4B - Moderately Suspicious  8/23/24 ([facility]) R MR guided biopsy: R superior posterior 8cmfn (stoplight): Lobular carcinoma in situ (LCIS), florid and classic types. Columnar cell change. Calcifications identified in benign epithelium. Concordant. Follow Up: continued surgical management of bx-proven ILC/LCIS and LCIS/Discordant finding.  9/6/24 ([facility])  RIGHT NIPPLE SPARING MASTECTOMY: 11mm ILC with alveolar features, 4 mm from closest inked anterior margin; LCIS with associated calcifications; no lymphatic invasion is identified; at least 2 biopsy site changes; fibrocystic changes with associated calcifications; IDP RIGHT SENTINEL LYMPH NODES: One lymph node with a subcapsular  lymphatic channel containing 3 isolated tumor cells RIGHT SENTINEL LYMPH NODES (0/3) SLN negative for tumor

## 2024-12-04 NOTE — ASSESSMENT
[FreeTextEntry1] : 52 year old presents for follow up evaluation s/p RIGHT nipple sparing mastectomy, R SLNB on 9/6/24 of Right ILC ER 95%, [place] 90%, HER2- and LCIS; initially seen as 2 vague areas of possible distortion at R 10:00 and R 12:00 with subsequent diagnostic imaging (8/9/24) yielding a 1cm area of distortion at R 10:00 5cmfn corresponding to a 9mm mass on sonogram, and a 1cm area of vague distortion at R 12:00 5cmfn corresponding to an 11mm mass on US, noting a distance between the 2 areas is approx. 15mm. Patient completed the recommended US-guided biopsy x2 sites of the right breast on 8/13/24, biopsy paths as follows: Right 12:00 5cmfn (Vision clip): moderately differentiated ILC, LCIS and  Right 10:00 5cmfn (Ring clip)- Fibrotic breast containing LCIS.   Patient met with rad onc who did not recommend adjuvant RT. Currently on Tamoxifen with Dr. Elliott, although recently d/c due to an outbreak of acne. States she has follow up with Dr. Elliott and intends to change this medication if able.  [employer] genetic testing 8/16/24 negative for pathogenic mutations; VUS in BRIP1 gene.   Sozo measurement today WNL. Patient without complaints. Physical exam WNL; Patient still with tissue expander in place, subtle ridging at the R inframammary fold d/t TE. Patient endorses close f/u with plastic surgeon, Dr. Lerman, as well as physical therapist for lymphatic massaging of the reconstructed breast. Patient scheduled to see Dr. Elliott, med onc, at the beginning of January and was advised to continue f/u as advised. Patient is doing well at this time. Plan for L sMMG/US and re-examination with repeat sozo measurement in August 2025. Patient verbalizes understanding and is in agreement with the plan.

## 2024-12-04 NOTE — HISTORY OF PRESENT ILLNESS
[FreeTextEntry1] : 52 year old AJ female, patient of Dr. Ofelia Mccarty, presents for follow up and repeat sozo measurement s/p RIGHT nipple sparing mastectomy + R SLNB on 9/6/24 of Right ILC ER 95%, KS 90%, HER2- and LCIS; initially seen as 2 vague areas of possible distortion at R 10:00 and R 12:00 on mammo, subsequently proven via US-guided biopsy x2 sites of the right breast on 8/13/24, paths as follows: Right 12:00 5cmfn (Vision)- moderately differentiated ILC, LCIS and Right 10:00 5cmfn (Ring)- Fibrotic breast containing LCIS.   Pre-op MRI (8/19/24), BI-RADS 4, yielded the previously biopsied areas as well as a 1cm area of NME and distortion in the superior R breast 8cmfn, noting this area is located superior to the known LCIS and ILC, and recommended MRI biopsy -- completed 8/23/24, path (stoplight clip) revealed LCIS, florid and classic types, columnar cell change, and calcifications identified in benign epithelium; Concordant and recommending continued surgical management of bx-proven ILC/LCIS and LCIS/Discordant finding.   Final surgical path yielded 11mm ILC with alveolar features, 4 mm from closest inked anterior margin; LCIS with associated calcifications; no lymphatic invasion is identified; at least 2 biopsy site changes; fibrocystic changes with associated calcifications; IDP; one lymph node with a subcapsular lymphatic channel containing 3 isolated tumor cells; 3 other SLN negative for tumor. Oncotype ordered 9/17/24, RS 11.  Patient met with med onc, Dr. Elliott, 10/8/24 who recommended Tamoxifen therapy. She met with rad onc, Dr. Falcon, 10/11/24 who stated in setting of stage IA disease s/p mastectomy with only ITCs, no adjuvant RT is indicated.  Patient denies family history of breast cancer.  Denies famhx of ovarian cancer. [employer] genetic testing 8/16/24 negative for pathogenic mutations; VUS in BRIP1 gene.   pT1c pN0 (i+)

## 2024-12-04 NOTE — ASSESSMENT
[FreeTextEntry1] : 52 year old presents for follow up evaluation s/p RIGHT nipple sparing mastectomy, R SLNB on 9/6/24 of Right ILC ER 95%, [place] 90%, HER2- and LCIS; initially seen as 2 vague areas of possible distortion at R 10:00 and R 12:00 with subsequent diagnostic imaging (8/9/24) yielding a 1cm area of distortion at R 10:00 5cmfn corresponding to a 9mm mass on sonogram, and a 1cm area of vague distortion at R 12:00 5cmfn corresponding to an 11mm mass on US, noting a distance between the 2 areas is approx. 15mm. Patient completed the recommended US-guided biopsy x2 sites of the right breast on 8/13/24, biopsy paths as follows: Right 12:00 5cmfn (Vision clip): moderately differentiated ILC, LCIS and  Right 10:00 5cmfn (Ring clip)- Fibrotic breast containing LCIS.   Patient met with rad onc who did not recommend adjuvant RT. Currently on Tamoxifen with Dr. Elliott, although recently d/c due to an outbreak of acne. States she has follow up with Dr. Elliott and intends to change this medication if able.  [employer] genetic testing 8/16/24 negative for pathogenic mutations; VUS in BRIP1 gene.   Sozo measurement today WNL. Patient without complaints. Physical exam WNL; Patient still with tissue expander in place, subtle ridging at the R inframammary fold d/t TE. Patient endorses close f/u with plastic surgeon, Dr. Lerman, as well as physical therapist for lymphatic massaging of the reconstructed breast. Patient scheduled to see Dr. Elliott, med onc, at the beginning of January and was advised to continue f/u as advised. Patient is doing well at this time. Plan for L sMMG/US and re-examination with repeat sozo measurement in August 2025. Patient verbalizes understanding and is in agreement with the plan.

## 2024-12-04 NOTE — HISTORY OF PRESENT ILLNESS
[FreeTextEntry1] : 52 year old AJ female, patient of Dr. Ofelia Mccarty, presents for follow up and repeat sozo measurement s/p RIGHT nipple sparing mastectomy + R SLNB on 9/6/24 of Right ILC ER 95%, [place] 90%, HER2- and LCIS; initially seen as 2 vague areas of possible distortion at R 10:00 and R 12:00 on mammo, subsequently proven via US-guided biopsy x2 sites of the right breast on 8/13/24, paths as follows: Right 12:00 5cmfn (Vision)- moderately differentiated ILC, LCIS and Right 10:00 5cmfn (Ring)- Fibrotic breast containing LCIS.   Pre-op MRI (8/19/24), BI-RADS 4, yielded the previously biopsied areas as well as a 1cm area of NME and distortion in the superior R breast 8cmfn, noting this area is located superior to the known LCIS and ILC, and recommended MRI biopsy -- completed 8/23/24, path (stoplight clip) revealed LCIS, florid and classic types, columnar cell change, and calcifications identified in benign epithelium; Concordant and recommending continued surgical management of bx-proven ILC/LCIS and LCIS/Discordant finding.   Final surgical path yielded 11mm ILC with alveolar features, 4 mm from closest inked anterior margin; LCIS with associated calcifications; no lymphatic invasion is identified; at least 2 biopsy site changes; fibrocystic changes with associated calcifications; IDP; one lymph node with a subcapsular lymphatic channel containing 3 isolated tumor cells; 3 other SLN negative for tumor. Oncotype ordered 9/17/24, RS 11.  Patient met with med onc, Dr. Elliott, 10/8/24 who recommended Tamoxifen therapy. She met with rad onc, Dr. Falcon, 10/11/24 who stated in setting of stage IA disease s/p mastectomy with only ITCs, no adjuvant RT is indicated.  Patient denies family history of breast cancer.  Denies famhx of ovarian cancer. [employer] genetic testing 8/16/24 negative for pathogenic mutations; VUS in BRIP1 gene.   pT1c pN0 (i+)

## 2024-12-10 NOTE — HISTORY OF PRESENT ILLNESS
[FreeTextEntry1] : 53 y/o female s/p right breast reconstruction with sub muscular T/E with Dr. Cruz on 09/06/2024. Denies any f/c/n/v. Patient is not taking any pain medication. Patient is applying Aquaphor to drain site. She is doing PT once a week with Nicolette.

## 2024-12-10 NOTE — PHYSICAL EXAM
[de-identified] : TE in good position, Incisions well healed, no collections or s/sx of infection, filled today + 50 cc

## 2024-12-10 NOTE — ASSESSMENT
[FreeTextEntry1] : 3 months right TE breast recon, doing well TE: 250/300 Continue sports bra Continue PT, massage right lateral chest We discussed placement of left breast implant for symmetry - she states today that she is happy going a little bigger. Her right TE today is bigger than her native left breast - she will need implant augmnetation on the left for symmetry. If she wants to go even bigger we can fill her again next visit. Her IMF scar is still thickened and firm - continue scar massage. Not yet ready to schedule 2nd stage recon RTC in 6 weeks for last TE fill

## 2024-12-10 NOTE — PHYSICAL EXAM
[de-identified] : TE in good position, Incisions well healed, no collections or s/sx of infection, filled today + 50 cc

## 2025-01-13 NOTE — ASSESSMENT
[FreeTextEntry1] : 53 yo AJ, My risk VUS BRIP1, perimenopause S/P Rt  NSP mastectomy  with TE and SLN -11mm ILC with alveolar features,  ER/[place] pos, her 2 neg, LCIS with associated calcifications; no lymphatic invasion is identified; One lymph node with a subcapsular lymphatic channel containing 3 isolated tumor cells, (0/3) SLN negative for tumor..ORS 11 Reviewed pathology and prognosis. Reviewed ORS  no chemo benefitsThe patient is a good candidate for endocrine therapy to reduce the risk of recurrence. My recommendation is TAMOXIFEN 20 mg PO daily fsince permenopausal    We discussed potential side effects of TAMOXIFEN, including but not limited to menopause hot flashes, blood clots and endometrial cancer, cataracts, improvement BMD and chol. Will consider change to  when in menopause  The patient understands the potential benefits, risks and alternatives. Cont Griffith 10/24-   Discussed risk distant recurrence, local and contralateral breast ca. Discussed finding of 3 cells in one SLN - possible "traumet". Would not change treatment plan.  All of the patient's questions were adequately addressed and answered during today's consultation. Our discussion covered a range of topics including diagnosis, prognosis, treatment options, potential risks and alternatives, as well as overall care.  She was instructed to reach out to me if she had any further questions or if there were any changes in her clinical condition.F/U 6 months Lt MMG/US 8/25 Annual pelvic US Discussed breast MRI - will f/u with Dr Cruz Complete reconstruction Discussed management anxiety Discussed relizen check cbc, chem , d, fsh, lh estradiol reviewed BMD - osteopenia reviewed diet and exercise F/U 6 months

## 2025-01-13 NOTE — PHYSICAL EXAM
[Restricted in physically strenuous activity but ambulatory and able to carry out work of a light or sedentary nature] : Status 1- Restricted in physically strenuous activity but ambulatory and able to carry out work of a light or sedentary nature, e.g., light house work, office work [Normal] : normal spine exam without palpable tenderness, no kyphosis or scoliosis [de-identified] : s/p Rt NSP mastectomy with TE, Lt unremarkable - no palp LN

## 2025-01-13 NOTE — HISTORY OF PRESENT ILLNESS
[de-identified] : 52 year old AJ  referred by Dr Cruz, for  evaluation s/p RIGHT nipple sparing mastectomy, R SLNB on 9/6/24 of Right ILC ER 95%, KS 90%, HER2- and LCIS; Presents with siter Zainab  8/9/24 ([facility]) B/L DX MMG/US: extremely dense. R 10:00 5cmfn posterior depth a new 1cm area of vague distortion corresponding to an irregular hypoechoic 9mm mass on sonogram, associated with amorphous calcifications. R 12:00 5cmfn posterior depth a new 1cm area of vague distortion corresponding to an irregular hypoechoic 11mm mass on sonogram. Distance between the 2 areas is approx 15mm. Follow Up: US-guided biopsy x2 sites right breast. BI-RADS 4, suspicious.  8/13/24 ([facility]) R US-guided biopsy x2 sites: 1. Right 12:00 5cmfn (Vision clip): Infiltrating lobular carcinoma, moderately differentiated, 11 mm in greatest dimension. ER/KS and HER2 IHC are pending. Lobular carcinoma in situ. 2. Right 10:00 5cmfn (Ring clip)- Fibrotic breast containing lobular carcinoma in situ. The results are concordant and malignant. Follow Up: surgical or oncological management.  ER 95%, KS 95%, her 2 neg  8/19/24 ([facility]) B/L MRI: heterogeneously dense. The breast is rotated laterally. Three findings in the right breast: 1. Right breast, superior breast, projecting slightly medially (corresponds to the 12:00 (5N)(VISION)(ILC + LCIS) located on the initial mammogram and ultrasound) is clip artifact within a 18 mm area of nonmass enhancement. Biopsy showed ILC = LCIS 2. Right breast, superior breast, projecting slightly lateral (5N)(RING) there is clip artifact within a 10 mm area of nonmass enhancement. Biopsy showed LCIS. Discordant. Recommend surgical removal. 3. Right breast, superior (8N), there is 1 cm of nonmass enhancement and distortion, Sag 5-185, axial 26611-897. This area is located superior to the known LCIS and ILC. Recommend MRI biopsy. RECOMMENDATION: MR guided biopsy. BI-RADS 4B - Moderately Suspicious  8/23/24 ([facility]) R MR guided biopsy: R superior posterior 8cmfn (stoplight): Lobular carcinoma in situ (LCIS), florid and classic types. Columnar cell change. Calcifications identified in benign epithelium. Concordant. Follow Up: continued surgical management of bx-proven ILC/LCIS and LCIS/Discordant finding.  9/6/24 ([facility]) RIGHT NIPPLE SPARING MASTECTOMY: 11mm ILC with alveolar features, 4 mm from closest inked anterior margin; LCIS with associated calcifications; no lymphatic invasion is identified; at least 2 biopsy site changes; fibrocystic changes with associated calcifications; IDP RIGHT SENTINEL LYMPH NODES: One lymph node with a subcapsular lymphatic channel containing 3 isolated tumor cells RIGHT SENTINEL LYMPH NODES (0/3) SLN negative for tumor.  T1cN0 (i+) ORS 11   Menstrual Hx: has no history of hormone replacement treatment. age at menarche was 10-11. LMP 9/10/24. The cycles have been regular.   Prior pregnancies: G0 and P0 .   Fertility Treatments: No.   Birth Control Pill Use: 16-47.   Breast Feeding History: N/A GYN 9/24/24 BMD never Colonoscopy never No significant med illness age 4 adenoids Lipoma thing L 7/23   Patient denies family history of breast cancer. Denies fam hx of ovarian cancer, prostate, pancreatic. Father AML 63 [employer] genetic testing 8/16/24 negative for pathogenic mutations; VUS in BRIP1 gene.  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 Occ Etoh, not sexually active, exercises regularly [de-identified] : 1/7/25 Started tamoxifen 10/15- LMP 10/13/24.  COVID positive 1 week before [place].Mild URI , fatigue - resolved No Etoh since 9/24 Hot flushes Acne -Went to derm who prescribed clindamycin 1% external cream- resolved Gyn 9/24 12/9/24 BMD- osteopenia 12/9/24 pelvis usL cyst stable, cyst endocerv jct Undergoing expansion Out of work Exercises almost daily c/o anxiety

## 2025-01-13 NOTE — PHYSICAL EXAM
[Restricted in physically strenuous activity but ambulatory and able to carry out work of a light or sedentary nature] : Status 1- Restricted in physically strenuous activity but ambulatory and able to carry out work of a light or sedentary nature, e.g., light house work, office work [Normal] : normal spine exam without palpable tenderness, no kyphosis or scoliosis [de-identified] : s/p Rt NSP mastectomy with TE, Lt unremarkable - no palp LN

## 2025-01-13 NOTE — ASSESSMENT
[FreeTextEntry1] : 51 yo AJ, My risk VUS BRIP1, perimenopause S/P Rt  NSP mastectomy  with TE and SLN -11mm ILC with alveolar features,  ER/[place] pos, her 2 neg, LCIS with associated calcifications; no lymphatic invasion is identified; One lymph node with a subcapsular lymphatic channel containing 3 isolated tumor cells, (0/3) SLN negative for tumor..ORS 11 Reviewed pathology and prognosis. Reviewed ORS  no chemo benefitsThe patient is a good candidate for endocrine therapy to reduce the risk of recurrence. My recommendation is TAMOXIFEN 20 mg PO daily fsince permenopausal    We discussed potential side effects of TAMOXIFEN, including but not limited to menopause hot flashes, blood clots and endometrial cancer, cataracts, improvement BMD and chol. Will consider change to  when in menopause  The patient understands the potential benefits, risks and alternatives. Cont Griffith 10/24-   Discussed risk distant recurrence, local and contralateral breast ca. Discussed finding of 3 cells in one SLN - possible "traumet". Would not change treatment plan.  All of the patient's questions were adequately addressed and answered during today's consultation. Our discussion covered a range of topics including diagnosis, prognosis, treatment options, potential risks and alternatives, as well as overall care.  She was instructed to reach out to me if she had any further questions or if there were any changes in her clinical condition.F/U 6 months Lt MMG/US 8/25 Annual pelvic US Discussed breast MRI - will f/u with Dr Cruz Complete reconstruction Discussed management anxiety Discussed relizen check cbc, chem , d, fsh, lh estradiol reviewed BMD - osteopenia reviewed diet and exercise F/U 6 months

## 2025-01-13 NOTE — ASSESSMENT
[FreeTextEntry1] : 53 yo AJ, My risk VUS BRIP1, perimenopause S/P Rt  NSP mastectomy  with TE and SLN -11mm ILC with alveolar features,  ER/[place] pos, her 2 neg, LCIS with associated calcifications; no lymphatic invasion is identified; One lymph node with a subcapsular lymphatic channel containing 3 isolated tumor cells, (0/3) SLN negative for tumor..ORS 11 Reviewed pathology and prognosis. Reviewed ORS  no chemo benefitsThe patient is a good candidate for endocrine therapy to reduce the risk of recurrence. My recommendation is TAMOXIFEN 20 mg PO daily fsince permenopausal    We discussed potential side effects of TAMOXIFEN, including but not limited to menopause hot flashes, blood clots and endometrial cancer, cataracts, improvement BMD and chol. Will consider change to  when in menopause  The patient understands the potential benefits, risks and alternatives. Cont Griffith 10/24-   Discussed risk distant recurrence, local and contralateral breast ca. Discussed finding of 3 cells in one SLN - possible "traumet". Would not change treatment plan.  All of the patient's questions were adequately addressed and answered during today's consultation. Our discussion covered a range of topics including diagnosis, prognosis, treatment options, potential risks and alternatives, as well as overall care.  She was instructed to reach out to me if she had any further questions or if there were any changes in her clinical condition.F/U 6 months Lt MMG/US 8/25 Annual pelvic US Discussed breast MRI - will f/u with Dr Cruz Complete reconstruction Discussed management anxiety Discussed relizen check cbc, chem , d, fsh, lh estradiol reviewed BMD - osteopenia reviewed diet and exercise F/U 6 months

## 2025-01-13 NOTE — REVIEW OF SYSTEMS
[Fatigue] : fatigue [Diarrhea: Grade 0] : Diarrhea: Grade 0 [Negative] : Allergic/Immunologic [FreeTextEntry2] : new hot flashes x 1.5 months, acne since starting tamoxifen [de-identified] : interruppted sleep

## 2025-01-13 NOTE — HISTORY OF PRESENT ILLNESS
[de-identified] : 52 year old AJ  referred by Dr Cruz, for  evaluation s/p RIGHT nipple sparing mastectomy, R SLNB on 9/6/24 of Right ILC ER 95%, UT 90%, HER2- and LCIS; Presents with siter Zainab  8/9/24 ([facility]) B/L DX MMG/US: extremely dense. R 10:00 5cmfn posterior depth a new 1cm area of vague distortion corresponding to an irregular hypoechoic 9mm mass on sonogram, associated with amorphous calcifications. R 12:00 5cmfn posterior depth a new 1cm area of vague distortion corresponding to an irregular hypoechoic 11mm mass on sonogram. Distance between the 2 areas is approx 15mm. Follow Up: US-guided biopsy x2 sites right breast. BI-RADS 4, suspicious.  8/13/24 ([facility]) R US-guided biopsy x2 sites: 1. Right 12:00 5cmfn (Vision clip): Infiltrating lobular carcinoma, moderately differentiated, 11 mm in greatest dimension. ER/UT and HER2 IHC are pending. Lobular carcinoma in situ. 2. Right 10:00 5cmfn (Ring clip)- Fibrotic breast containing lobular carcinoma in situ. The results are concordant and malignant. Follow Up: surgical or oncological management.  ER 95%, UT 95%, her 2 neg  8/19/24 ([facility]) B/L MRI: heterogeneously dense. The breast is rotated laterally. Three findings in the right breast: 1. Right breast, superior breast, projecting slightly medially (corresponds to the 12:00 (5N)(VISION)(ILC + LCIS) located on the initial mammogram and ultrasound) is clip artifact within a 18 mm area of nonmass enhancement. Biopsy showed ILC = LCIS 2. Right breast, superior breast, projecting slightly lateral (5N)(RING) there is clip artifact within a 10 mm area of nonmass enhancement. Biopsy showed LCIS. Discordant. Recommend surgical removal. 3. Right breast, superior (8N), there is 1 cm of nonmass enhancement and distortion, Sag 5-185, axial 81529-332. This area is located superior to the known LCIS and ILC. Recommend MRI biopsy. RECOMMENDATION: MR guided biopsy. BI-RADS 4B - Moderately Suspicious  8/23/24 ([facility]) R MR guided biopsy: R superior posterior 8cmfn (stoplight): Lobular carcinoma in situ (LCIS), florid and classic types. Columnar cell change. Calcifications identified in benign epithelium. Concordant. Follow Up: continued surgical management of bx-proven ILC/LCIS and LCIS/Discordant finding.  9/6/24 ([facility]) RIGHT NIPPLE SPARING MASTECTOMY: 11mm ILC with alveolar features, 4 mm from closest inked anterior margin; LCIS with associated calcifications; no lymphatic invasion is identified; at least 2 biopsy site changes; fibrocystic changes with associated calcifications; IDP RIGHT SENTINEL LYMPH NODES: One lymph node with a subcapsular lymphatic channel containing 3 isolated tumor cells RIGHT SENTINEL LYMPH NODES (0/3) SLN negative for tumor.  T1cN0 (i+) ORS 11   Menstrual Hx: has no history of hormone replacement treatment. age at menarche was 10-11. LMP 9/10/24. The cycles have been regular.   Prior pregnancies: G0 and P0 .   Fertility Treatments: No.   Birth Control Pill Use: 16-47.   Breast Feeding History: N/A GYN 9/24/24 BMD never Colonoscopy never No significant med illness age 4 adenoids Lipoma thing L 7/23   Patient denies family history of breast cancer. Denies fam hx of ovarian cancer, prostate, pancreatic. Father AML 63 [employer] genetic testing 8/16/24 negative for pathogenic mutations; VUS in BRIP1 gene.  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 Occ Etoh, not sexually active, exercises regularly [de-identified] : 1/7/25 Started tamoxifen 10/15- LMP 10/13/24.  COVID positive 1 week before [place].Mild URI , fatigue - resolved No Etoh since 9/24 Hot flushes Acne -Went to derm who prescribed clindamycin 1% external cream- resolved Gyn 9/24 12/9/24 BMD- osteopenia 12/9/24 pelvis usL cyst stable, cyst endocerv jct Undergoing expansion Out of work Exercises almost daily c/o anxiety

## 2025-01-13 NOTE — REVIEW OF SYSTEMS
[Fatigue] : fatigue [Diarrhea: Grade 0] : Diarrhea: Grade 0 [Negative] : Allergic/Immunologic [FreeTextEntry2] : new hot flashes x 1.5 months, acne since starting tamoxifen [de-identified] : interruppted sleep

## 2025-01-13 NOTE — HISTORY OF PRESENT ILLNESS
[de-identified] : 52 year old AJ  referred by Dr Cruz, for  evaluation s/p RIGHT nipple sparing mastectomy, R SLNB on 9/6/24 of Right ILC ER 95%, HI 90%, HER2- and LCIS; Presents with siter Zainab  8/9/24 ([facility]) B/L DX MMG/US: extremely dense. R 10:00 5cmfn posterior depth a new 1cm area of vague distortion corresponding to an irregular hypoechoic 9mm mass on sonogram, associated with amorphous calcifications. R 12:00 5cmfn posterior depth a new 1cm area of vague distortion corresponding to an irregular hypoechoic 11mm mass on sonogram. Distance between the 2 areas is approx 15mm. Follow Up: US-guided biopsy x2 sites right breast. BI-RADS 4, suspicious.  8/13/24 ([facility]) R US-guided biopsy x2 sites: 1. Right 12:00 5cmfn (Vision clip): Infiltrating lobular carcinoma, moderately differentiated, 11 mm in greatest dimension. ER/HI and HER2 IHC are pending. Lobular carcinoma in situ. 2. Right 10:00 5cmfn (Ring clip)- Fibrotic breast containing lobular carcinoma in situ. The results are concordant and malignant. Follow Up: surgical or oncological management.  ER 95%, HI 95%, her 2 neg  8/19/24 ([facility]) B/L MRI: heterogeneously dense. The breast is rotated laterally. Three findings in the right breast: 1. Right breast, superior breast, projecting slightly medially (corresponds to the 12:00 (5N)(VISION)(ILC + LCIS) located on the initial mammogram and ultrasound) is clip artifact within a 18 mm area of nonmass enhancement. Biopsy showed ILC = LCIS 2. Right breast, superior breast, projecting slightly lateral (5N)(RING) there is clip artifact within a 10 mm area of nonmass enhancement. Biopsy showed LCIS. Discordant. Recommend surgical removal. 3. Right breast, superior (8N), there is 1 cm of nonmass enhancement and distortion, Sag 5-185, axial 14576-738. This area is located superior to the known LCIS and ILC. Recommend MRI biopsy. RECOMMENDATION: MR guided biopsy. BI-RADS 4B - Moderately Suspicious  8/23/24 ([facility]) R MR guided biopsy: R superior posterior 8cmfn (stoplight): Lobular carcinoma in situ (LCIS), florid and classic types. Columnar cell change. Calcifications identified in benign epithelium. Concordant. Follow Up: continued surgical management of bx-proven ILC/LCIS and LCIS/Discordant finding.  9/6/24 ([facility]) RIGHT NIPPLE SPARING MASTECTOMY: 11mm ILC with alveolar features, 4 mm from closest inked anterior margin; LCIS with associated calcifications; no lymphatic invasion is identified; at least 2 biopsy site changes; fibrocystic changes with associated calcifications; IDP RIGHT SENTINEL LYMPH NODES: One lymph node with a subcapsular lymphatic channel containing 3 isolated tumor cells RIGHT SENTINEL LYMPH NODES (0/3) SLN negative for tumor.  T1cN0 (i+) ORS 11   Menstrual Hx: has no history of hormone replacement treatment. age at menarche was 10-11. LMP 9/10/24. The cycles have been regular.   Prior pregnancies: G0 and P0 .   Fertility Treatments: No.   Birth Control Pill Use: 16-47.   Breast Feeding History: N/A GYN 9/24/24 BMD never Colonoscopy never No significant med illness age 4 adenoids Lipoma thing L 7/23   Patient denies family history of breast cancer. Denies fam hx of ovarian cancer, prostate, pancreatic. Father AML 63 [employer] genetic testing 8/16/24 negative for pathogenic mutations; VUS in BRIP1 gene.  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 Occ Etoh, not sexually active, exercises regularly [de-identified] : 1/7/25 Started tamoxifen 10/15- LMP 10/13/24.  COVID positive 1 week before [place].Mild URI , fatigue - resolved No Etoh since 9/24 Hot flushes Acne -Went to derm who prescribed clindamycin 1% external cream- resolved Gyn 9/24 12/9/24 BMD- osteopenia 12/9/24 pelvis usL cyst stable, cyst endocerv jct Undergoing expansion Out of work Exercises almost daily c/o anxiety

## 2025-01-13 NOTE — PHYSICAL EXAM
[Restricted in physically strenuous activity but ambulatory and able to carry out work of a light or sedentary nature] : Status 1- Restricted in physically strenuous activity but ambulatory and able to carry out work of a light or sedentary nature, e.g., light house work, office work [Normal] : normal spine exam without palpable tenderness, no kyphosis or scoliosis [de-identified] : s/p Rt NSP mastectomy with TE, Lt unremarkable - no palp LN

## 2025-01-13 NOTE — REVIEW OF SYSTEMS
[Fatigue] : fatigue [Diarrhea: Grade 0] : Diarrhea: Grade 0 [Negative] : Allergic/Immunologic [FreeTextEntry2] : new hot flashes x 1.5 months, acne since starting tamoxifen [de-identified] : interruppted sleep

## 2025-01-21 NOTE — HISTORY OF PRESENT ILLNESS
[FreeTextEntry1] : 53 y/o female s/p right breast reconstruction with sub muscular T/E with Dr. Cruz on 09/06/2024. Denies any f/c/n/v. Patient is not taking any pain medication. Patient is applying Aquaphor and silicone strips. She is doing PT once a week with Nicolette. She would like to go a little bigger than current size.

## 2025-01-21 NOTE — PHYSICAL EXAM
[de-identified] : TE in good position, Incisions well healed, no collections or s/sx of infection - expanded today +30cc

## 2025-01-21 NOTE — ASSESSMENT
[FreeTextEntry1] : 3 1/2 months right TE breast recon, doing well Filled today + 30 cc, TE: 280/300 Continue sports bra Continue PT, massage right lateral chest Will plan for TE to silicone implant exchange and left breast augmentation for symmetry, with possible fat grafting from bilateral flanks / love handles I reviewed with her the risks benefits and alternatives of the next stage of the reconstruction including the risk of donor site morbidity from fat grafting

## 2025-01-21 NOTE — PHYSICAL EXAM
[de-identified] : TE in good position, Incisions well healed, no collections or s/sx of infection - expanded today +30cc

## 2025-01-21 NOTE — REASON FOR VISIT
[Post Op: _________] : a [unfilled] post op visit [FreeTextEntry1] : Dr. Cruz [Follow-Up: _____] : a [unfilled] follow-up visit

## 2025-02-07 NOTE — HISTORY OF PRESENT ILLNESS
[No Pertinent Cardiac History] : no history of aortic stenosis, atrial fibrillation, coronary artery disease, recent myocardial infarction, or implantable device/pacemaker [No Pertinent Pulmonary History] : no history of asthma, COPD, sleep apnea, or smoking [No Adverse Anesthesia Reaction] : no adverse anesthesia reaction in self or family member [(Patient denies any chest pain, claudication, dyspnea on exertion, orthopnea, palpitations or syncope)] : Patient denies any chest pain, claudication, dyspnea on exertion, orthopnea, palpitations or syncope [Excellent (>10 METs)] : Excellent (>10 METs) [Chronic Anticoagulation] : no chronic anticoagulation [Chronic Kidney Disease] : no chronic kidney disease [Diabetes] : no diabetes [FreeTextEntry1] : R 2nd stage reconstruction, implant exchange, L breast augmentation for symmetry [FreeTextEntry2] : [id number] [FreeTextEntry3] : Dr Lerman [FreeTextEntry4] : This is a 52F with PMHx of R breast cancer (ER+) h/o tamoxifen therapy here for pre-op for R 2nd stage reconstruction and L breast augmentation with Dr. Lerman. Denies any ROS today. Denies any history of prior reaction to anesthesia in self or family. Denies any history of blood clots or bleeding disorders in self or family. Denies any CP/SOB on exertion and is able to walk up 2 flights of stairs without issue.

## 2025-02-07 NOTE — HISTORY OF PRESENT ILLNESS
[No Pertinent Cardiac History] : no history of aortic stenosis, atrial fibrillation, coronary artery disease, recent myocardial infarction, or implantable device/pacemaker [No Pertinent Pulmonary History] : no history of asthma, COPD, sleep apnea, or smoking [No Adverse Anesthesia Reaction] : no adverse anesthesia reaction in self or family member [(Patient denies any chest pain, claudication, dyspnea on exertion, orthopnea, palpitations or syncope)] : Patient denies any chest pain, claudication, dyspnea on exertion, orthopnea, palpitations or syncope [Excellent (>10 METs)] : Excellent (>10 METs) [Chronic Anticoagulation] : no chronic anticoagulation [Chronic Kidney Disease] : no chronic kidney disease [Diabetes] : no diabetes [FreeTextEntry1] : R 2nd stage reconstruction, implant exchange, L breast augmentation for symmetry [FreeTextEntry2] : [id number] [FreeTextEntry3] : Dr Lerman [FreeTextEntry4] : This is a 52F with PMHx of R breast cancer (ER+) h/o tamoxifen therapy here for pre-op for R 2nd stage reconstruction and L breast augmentation with Dr. Lerman. Denies any ROS today. Denies any history of prior reaction to anesthesia in self or family. Denies any history of blood clots or bleeding disorders in self or family. Denies any CP/SOB on exertion and is able to walk up 2 flights of stairs without issue.

## 2025-02-07 NOTE — ASSESSMENT
[Patient Optimized for Surgery] : Patient optimized for surgery [As per surgery] : as per surgery [FreeTextEntry4] : Patient is planned for  pre-op for R 2nd stage reconstruction and L breast augmentation with Dr. Lerman on 2/21/2025. Medications: HOLD tamoxifen one week prior to surgery per primary surgery team. Can continue Reclizen perioperatively. Labwork: PT/PTT/INR and EKG today per surgeon request. CBC, CMP reviewed and WNL.  [place]illo 0.0% FREEDOM Risk of MI or cardiac arrest intraoperatively or up to 30 days post-op. RCRI 0 points Class 1 Risk 3.9% 30-day risk of death, MI, or cardiac arrest. Pending review of bloodwork and EKG, patient is otherwise low-risk for low-risk procedure.

## 2025-02-13 NOTE — HISTORY OF PRESENT ILLNESS
[Home] : at home, [unfilled] , at the time of the visit. [Other Location: e.g. Home (Enter Location, City,State)___] : at [unfilled] [Telehealth (audio & video)] : This visit was provided via telehealth using real-time 2-way audio visual technology. [Verbal consent obtained from patient] : the patient, [unfilled] [FreeTextEntry1] : Patient Name: CLEMENCIA PERLA : 1972 Date: 2025 Attending: Dr. Oren Lerman   HPI: follow up appointment for bilateral breast reconstruction revision with right breast TE to implant exchange, left implant placement for symmetry and fat grafting   Allergies: PCN Medication prescribed: ibuprofen 600 mg, oxycodone 5 mg   ROS: complete 14 point review of systems negative except pertinent items reviewed in the HPI. Other non-contributory items reviewed in our new patient questionnaire and we have submitted it to be scanned into the medical record.   Physical Exam: completed at time of in office evaluation   Assessment/Plan: We have discussed pre and postop instructions, recovery limitations, restrictions and expectations, ERAS protocol, NPO status, transportation home, postop medications, LATANYA drains, compression garments, benefits and risks of the procedure. Pre-op labs reviewed. The patient would like to proceed with surgery as scheduled.   ISABEL ART NP

## 2025-02-25 NOTE — HISTORY OF PRESENT ILLNESS
[FreeTextEntry1] : 54 y/o female presents 4 days s/p revision right breast reconstruction implant exchange left breast augmentation for symmetry and fat grafting from bilateral flanks on 02/21/2025. Denies any f/c/n/v. Patient is taking ibuprofen for the pain. Patient c/o muscle cramp in her left breast at night.

## 2025-02-25 NOTE — SURGICAL HISTORY
[de-identified] : 09/06/2024: right breast reconstruction with sub muscular TE with Dr. Cruz [de-identified] : 02/21/2025: revision right breast exchange left breast augmentation for symmetry and fat grafting from bilateral flanks  RT - [id number]

## 2025-02-25 NOTE — PHYSICAL EXAM
[de-identified] : Implants in good position with good symmetry and contour, no collections or infection.

## 2025-02-25 NOTE — SURGICAL HISTORY
[de-identified] : 09/06/2024: right breast reconstruction with sub muscular TE with Dr. Cruz [de-identified] : 02/21/2025: revision right breast exchange left breast augmentation for symmetry and fat grafting from bilateral flanks  RT - [id number]

## 2025-02-25 NOTE — HISTORY OF PRESENT ILLNESS
[FreeTextEntry1] : 52 y/o female presents 4 days s/p revision right breast reconstruction implant exchange left breast augmentation for symmetry and fat grafting from bilateral flanks on 02/21/2025. Denies any f/c/n/v. Patient is taking ibuprofen for the pain. Patient c/o muscle cramp in her left breast at night.

## 2025-02-25 NOTE — PHYSICAL EXAM
[de-identified] : Implants in good position with good symmetry and contour, no collections or infection.

## 2025-02-25 NOTE — ASSESSMENT
[FreeTextEntry1] : Healing well POD#4 S/P right breast 2nd stage recon and left breast augmentation for symmetry with fat grafting from the thighs. Very happy with the results with good symmetry and contour

## 2025-04-05 NOTE — HISTORY OF PRESENT ILLNESS
[FreeTextEntry1] : 52 y/o female s/p revision right breast reconstruction implant exchange left breast augmentation for symmetry and fat grafting from bilateral flanks on 02/21/2025. Denies any f/c/n/v.

## 2025-04-05 NOTE — HISTORY OF PRESENT ILLNESS
[FreeTextEntry1] : 54 y/o female s/p revision right breast reconstruction implant exchange left breast augmentation for symmetry and fat grafting from bilateral flanks on 02/21/2025. Denies any f/c/n/v.

## 2025-04-05 NOTE — SURGICAL HISTORY
[de-identified] : 09/06/2024: right breast reconstruction with sub muscular TE with Dr. Cruz [de-identified] : 02/21/2025: revision right breast exchange left breast augmentation for symmetry and fat grafting from bilateral flanks: 345 cc on right, 175 cc on left RT - [id number]

## 2025-04-05 NOTE — PHYSICAL EXAM
[de-identified] : Implants in good position with good symmetry and contour, no collections or infection. left nipple on the native left bresat is slightly lower, ~ 1cm, than right due to natural left breast teardrop shape.  Less right upper pole fullness than left due to mastectomy excision and fat graft atrophy

## 2025-04-05 NOTE — ASSESSMENT
[FreeTextEntry1] : 6 weeks S/P right breast 2nd stage recon and left breast augmentation for symmetry with fat grafting from the thighs. She has excellent results with good symmetry and contour. slight asymmetries that are to be expected.  The 80 cc of fat grafted into the step-off deformity on the right breast mound and chest wall has partially resolved.  The left native breast nipple position has a more natural teardrop shape and is slightly lower.  It is possible for the left implant to settle which will lead to less upper pole fullness on the left and possible elevation of the left nipple complex and therefore I encouraged her to perform twice daily massage left breast.  We will also start her on leukotriene inhibitor to minimize risk of capsular contracture Rx for merlin sent, will check LFTs Continue silicone strips and or silicone gel to the incisions as well as scar massage She has an excellent result and we discussed the fact that she will never have exact symmetry between the mastectomy reconstructed breast on the right and the native left breast I also reminded her that implant reconstruction is not meant to last for the rest of her life and that her body will change with time and that she will eventually need revision surgery and therefore I advised her against any consideration of additional revision surgery at this time without giving it at least 1 year to heal and settle we can reevaluate her and discuss possible additional fat grafting to right upper pole this could have the same outcome and clearly she could have complications from any revision surgery additionally there could be donor site deformity as well.  I reinforced with her and assured her that she has an excellent result and probably should not pursue any additional revision surgery. RTC 7-8 months

## 2025-04-05 NOTE — SURGICAL HISTORY
[de-identified] : 09/06/2024: right breast reconstruction with sub muscular TE with Dr. Cruz [de-identified] : 02/21/2025: revision right breast exchange left breast augmentation for symmetry and fat grafting from bilateral flanks: 345 cc on right, 175 cc on left RT - [id number] LT

## 2025-04-05 NOTE — PHYSICAL EXAM
[de-identified] : Implants in good position with good symmetry and contour, no collections or infection. left nipple on the native left bresat is slightly lower, ~ 1cm, than right due to natural left breast teardrop shape.  Less right upper pole fullness than left due to mastectomy excision and fat graft atrophy

## 2025-07-01 NOTE — HISTORY OF PRESENT ILLNESS
[FreeTextEntry1] : 52 y/o female 4.5 months PO s/p revision right breast reconstruction implant exchange left breast augmentation for symmetry and fat grafting from bilateral flanks on 02/21/2025. Denies any f/c/n/v. She c/o location of right IMF incision.  She states when she raises her raise, she can see the muscle, implant, and rippling. She states she does not have full range mobility in her left arm. She completed Rx for [email or web address].

## 2025-07-01 NOTE — SURGICAL HISTORY
[de-identified] : 09/06/2024: right breast reconstruction with sub muscular TE with Dr. Cruz [de-identified] : 02/21/2025: revision right breast exchange left breast augmentation for symmetry and fat grafting from bilateral flanks: 345 cc on right, 175 cc on left RT - [id number] LT

## 2025-07-01 NOTE — PHYSICAL EXAM
"Pt is calm and cooperative currently, asking what he did and stating \"im sorry\".   " [de-identified] : Implants in good position with good symmetry and contour, no collections or infection. left nipple on the native left breast is slightly lower, ~ 1cm, than right due to natural left breast teardrop shape.  Less right upper pole fullness/ stepoff deformity than left due to skeletonization of soft tissue at the time of the mastectomy. Right implant has settled slightly. She has limited RUE ROM

## 2025-07-01 NOTE — ASSESSMENT
[FreeTextEntry1] : 5 months PO with excellent result and we discussed the fact that she will never have exact symmetry between the mastectomy reconstructed breast on the right and the native left breast I also reminded her that implant reconstruction is not meant to last for the rest of her life and that her body will change with time and that she will eventually need revision surgery and therefore I advised her against any additional revision surgery at this time and would only consider revision if there was significant deformity or asymmetry with benefit of surgery.  I reinforced with her and assured her that she has an excellent result and probably should not pursue any additional revision surgery. We discussed possible use of alloclae in the future when it becomes available to address stepoff deformity and rippling to right breast mound. We will contact her as soon as we have it available otherwise RTC in 1 year She should Continue PT to increase RUE ROM - we gave her a new script

## 2025-07-15 NOTE — HISTORY OF PRESENT ILLNESS
[de-identified] : 52 year old AJ  referred by Dr Cruz, for  evaluation s/p RIGHT nipple sparing mastectomy, R SLNB on 9/6/24 of Right ILC ER 95%, WY 90%, HER2- and LCIS; Presents with siter Zaniab  8/9/24 ([facility]) B/L DX MMG/US: extremely dense. R 10:00 5cmfn posterior depth a new 1cm area of vague distortion corresponding to an irregular hypoechoic 9mm mass on sonogram, associated with amorphous calcifications. R 12:00 5cmfn posterior depth a new 1cm area of vague distortion corresponding to an irregular hypoechoic 11mm mass on sonogram. Distance between the 2 areas is approx 15mm. Follow Up: US-guided biopsy x2 sites right breast. BI-RADS 4, suspicious.  8/13/24 ([facility]) R US-guided biopsy x2 sites: 1. Right 12:00 5cmfn (Vision clip): Infiltrating lobular carcinoma, moderately differentiated, 11 mm in greatest dimension. ER/[place] and HER2 IHC are pending. Lobular carcinoma in situ. 2. Right 10:00 5cmfn (Ring clip)- Fibrotic breast containing lobular carcinoma in situ. The results are concordant and malignant. Follow Up: surgical or oncological management.  ER 95%, WY 95%, her 2 neg  8/19/24 ([facility]) B/L MRI: heterogeneously dense. The breast is rotated laterally. Three findings in the right breast: 1. Right breast, superior breast, projecting slightly medially (corresponds to the 12:00 (5N)(VISION)(ILC + LCIS) located on the initial mammogram and ultrasound) is clip artifact within a 18 mm area of nonmass enhancement. Biopsy showed ILC = LCIS 2. Right breast, superior breast, projecting slightly lateral (5N)(RING) there is clip artifact within a 10 mm area of nonmass enhancement. Biopsy showed LCIS. Discordant. Recommend surgical removal. 3. Right breast, superior (8N), there is 1 cm of nonmass enhancement and distortion, Sag 5-185, axial 37975-172. This area is located superior to the known LCIS and ILC. Recommend MRI biopsy. RECOMMENDATION: MR guided biopsy. BI-RADS 4B - Moderately Suspicious  8/23/24 ([facility]) R MR guided biopsy: R superior posterior 8cmfn (stoplight): Lobular carcinoma in situ (LCIS), florid and classic types. Columnar cell change. Calcifications identified in benign epithelium. Concordant. Follow Up: continued surgical management of bx-proven ILC/LCIS and LCIS/Discordant finding.  9/6/24 ([facility]) RIGHT NIPPLE SPARING MASTECTOMY: 11mm ILC with alveolar features, 4 mm from closest inked anterior margin; LCIS with associated calcifications; no lymphatic invasion is identified; at least 2 biopsy site changes; fibrocystic changes with associated calcifications; IDP RIGHT SENTINEL LYMPH NODES: One lymph node with a subcapsular lymphatic channel containing 3 isolated tumor cells RIGHT SENTINEL LYMPH NODES (0/3) SLN negative for tumor.  T1cN0 (i+) ORS 11   Menstrual Hx: has no history of hormone replacement treatment. age at menarche was 10-11. LMP 9/10/24. The cycles have been regular.   Prior pregnancies: G0 and P0 .   Fertility Treatments: No.   Birth Control Pill Use: 16-47.   Breast Feeding History: N/A GYN 9/24/24 BMD never Colonoscopy never No significant med illness age 4 adenoids Lipoma thing L 7/23   Patient denies family history of breast cancer. Denies fam hx of ovarian cancer, prostate, pancreatic. Father AML 63 [employer] genetic testing 8/16/24 negative for pathogenic mutations; VUS in BRIP1 gene.  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 Occ Etoh, not sexually active, exercises regularly [de-identified] : 1/7/25 Started tamoxifen 10/15- LMP 10/13/24.  COVID positive 1 week before Edward.Mild URI , fatigue - resolved No Etoh since 9/24 Hot flushes Acne -Went to derm who prescribed clindamycin 1% external cream- resolved Gyn 9/24 12/9/24 BMD- osteopenia 12/9/24 pelvis usL cyst stable, cyst endocerv jct Undergoing expansion Out of work Exercises almost daily c/o anxiety  7/15/25 s/p Rt NSM 9/6/24 - continues tamoxifen 20mg/d (started 10/2024) s/p Rt breast revision reconstruction 2/21/25 BMD 12/9/24 - osteopenia spine, femoral neck/hip normal US pelvis 12/9/24 - stable Lt ovarian simple cyst, cyst endocervical junction, fibroids limited RUE ROM - finished PT, rx renewed by Dr. Lerman, will see OT as well colonoscopy - needs to reschedule gyn 9/2024- wnl, annual f/u occ hot flushes - usually not bothersome, worse w/ stress, trialed Relizen w/ some relief has difficulty staying asleep but able to fall asleep quickly, frequent wakeups - may try CBTI very occ/rare b/l knee joint discomfort - worse when squatting, not bothersome LMP 10/24 - staining 7/1/25 strength-training 4 days/week no EtOH denies vaginal discharge, occ dryness  stressed lately, unemployment, having hard time finding a job difficulty sleeping

## 2025-07-15 NOTE — PHYSICAL EXAM
[Restricted in physically strenuous activity but ambulatory and able to carry out work of a light or sedentary nature] : Status 1- Restricted in physically strenuous activity but ambulatory and able to carry out work of a light or sedentary nature, e.g., light house work, office work [Normal] : affect appropriate [de-identified] : s/p Rt NSP mastectomy with TE, Lt unremarkable - 1cm shotty Rt ax LN

## 2025-07-15 NOTE — PHYSICAL EXAM
[Restricted in physically strenuous activity but ambulatory and able to carry out work of a light or sedentary nature] : Status 1- Restricted in physically strenuous activity but ambulatory and able to carry out work of a light or sedentary nature, e.g., light house work, office work [Normal] : affect appropriate [de-identified] : s/p Rt NSP mastectomy with TE, Lt unremarkable - 1cm shotty Rt ax LN

## 2025-07-15 NOTE — REVIEW OF SYSTEMS
[Diarrhea: Grade 0] : Diarrhea: Grade 0 [FreeTextEntry2] : new hot flashes x 1.5 months, acne since starting tamoxifen [Anxiety] : anxiety [Negative] : Constitutional [de-identified] : interrupted sleep

## 2025-07-15 NOTE — HISTORY OF PRESENT ILLNESS
[de-identified] : 52 year old AJ  referred by Dr Cruz, for  evaluation s/p RIGHT nipple sparing mastectomy, R SLNB on 9/6/24 of Right ILC ER 95%, [place] 90%, HER2- and LCIS; Presents with siter Zainab  8/9/24 ([facility]) B/L DX MMG/US: extremely dense. R 10:00 5cmfn posterior depth a new 1cm area of vague distortion corresponding to an irregular hypoechoic 9mm mass on sonogram, associated with amorphous calcifications. R 12:00 5cmfn posterior depth a new 1cm area of vague distortion corresponding to an irregular hypoechoic 11mm mass on sonogram. Distance between the 2 areas is approx 15mm. Follow Up: US-guided biopsy x2 sites right breast. BI-RADS 4, suspicious.  8/13/24 ([facility]) R US-guided biopsy x2 sites: 1. Right 12:00 5cmfn (Vision clip): Infiltrating lobular carcinoma, moderately differentiated, 11 mm in greatest dimension. ER/AK and HER2 IHC are pending. Lobular carcinoma in situ. 2. Right 10:00 5cmfn (Ring clip)- Fibrotic breast containing lobular carcinoma in situ. The results are concordant and malignant. Follow Up: surgical or oncological management.  ER 95%, AK 95%, her 2 neg  8/19/24 ([facility]) B/L MRI: heterogeneously dense. The breast is rotated laterally. Three findings in the right breast: 1. Right breast, superior breast, projecting slightly medially (corresponds to the 12:00 (5N)(VISION)(ILC + LCIS) located on the initial mammogram and ultrasound) is clip artifact within a 18 mm area of nonmass enhancement. Biopsy showed ILC = LCIS 2. Right breast, superior breast, projecting slightly lateral (5N)(RING) there is clip artifact within a 10 mm area of nonmass enhancement. Biopsy showed LCIS. Discordant. Recommend surgical removal. 3. Right breast, superior (8N), there is 1 cm of nonmass enhancement and distortion, Sag 5-185, axial 11623-891. This area is located superior to the known LCIS and ILC. Recommend MRI biopsy. RECOMMENDATION: MR guided biopsy. BI-RADS 4B - Moderately Suspicious  8/23/24 ([facility]) R MR guided biopsy: R superior posterior 8cmfn (stoplight): Lobular carcinoma in situ (LCIS), florid and classic types. Columnar cell change. Calcifications identified in benign epithelium. Concordant. Follow Up: continued surgical management of bx-proven ILC/LCIS and LCIS/Discordant finding.  9/6/24 ([facility]) RIGHT NIPPLE SPARING MASTECTOMY: 11mm ILC with alveolar features, 4 mm from closest inked anterior margin; LCIS with associated calcifications; no lymphatic invasion is identified; at least 2 biopsy site changes; fibrocystic changes with associated calcifications; IDP RIGHT SENTINEL LYMPH NODES: One lymph node with a subcapsular lymphatic channel containing 3 isolated tumor cells RIGHT SENTINEL LYMPH NODES (0/3) SLN negative for tumor.  T1cN0 (i+) ORS 11   Menstrual Hx: has no history of hormone replacement treatment. age at menarche was 10-11. LMP 9/10/24. The cycles have been regular.   Prior pregnancies: G0 and P0 .   Fertility Treatments: No.   Birth Control Pill Use: 16-47.   Breast Feeding History: N/A GYN 9/24/24 BMD never Colonoscopy never No significant med illness age 4 adenoids Lipoma thing L 7/23   Patient denies family history of breast cancer. Denies fam hx of ovarian cancer, prostate, pancreatic. Father AML 63 [employer] [place] genetic testing 8/16/24 negative for pathogenic mutations; VUS in BRIP1 gene.  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 Occ Etoh, not sexually active, exercises regularly [de-identified] : 1/7/25 Started tamoxifen 10/15- LMP 10/13/24.  COVID positive 1 week before Edward.Mild URI , fatigue - resolved No Etoh since 9/24 Hot flushes Acne -Went to derm who prescribed clindamycin 1% external cream- resolved Gyn 9/24 12/9/24 BMD- osteopenia 12/9/24 pelvis usL cyst stable, cyst endocerv jct Undergoing expansion Out of work Exercises almost daily c/o anxiety  7/15/25 s/p Rt NSM 9/6/24 - continues tamoxifen 20mg/d (started 10/2024) s/p Rt breast revision reconstruction 2/21/25 BMD 12/9/24 - osteopenia spine, femoral neck/hip normal US pelvis 12/9/24 - stable Lt ovarian simple cyst, cyst endocervical junction, fibroids limited RUE ROM - finished PT, rx renewed by Dr. Lerman, will see OT as well colonoscopy - needs to reschedule gyn 9/2024- wnl, annual f/u occ hot flushes - usually not bothersome, worse w/ stress, trialed Relizen w/ some relief has difficulty staying asleep but able to fall asleep quickly, frequent wakeups - may try CBTI very occ/rare b/l knee joint discomfort - worse when squatting, not bothersome LMP 10/24 - staining 7/1/25 strength-training 4 days/week no EtOH denies vaginal discharge, occ dryness  stressed lately, unemployment, having hard time finding a job difficulty sleeping

## 2025-07-15 NOTE — ASSESSMENT
[With Patient/Caregiver] : With Patient/Caregiver [Designated Health Care Proxy] : Designated Health Care Proxy [Name: ___] : Name: [unfilled] [Relationship: ___] : Relationship: [unfilled] [FreeTextEntry1] : 54 yo AJ, My risk VUS BRIP1, perimenopause S/P Rt  NSP mastectomy  with TE and SLN -11mm ILC with alveolar features,  ER/IN pos, her 2 neg, LCIS with associated calcifications; no lymphatic invasion is identified; One lymph node with a subcapsular lymphatic channel containing 3 isolated tumor cells, (0/3) SLN negative for tumor..ORS 11 Reviewed pathology and prognosis. Reviewed ORS  no chemo benefitsThe patient is a good candidate for endocrine therapy to reduce the risk of recurrence. My recommendation is TAMOXIFEN 20 mg PO daily since permenopausal    We discussed potential side effects of TAMOXIFEN, including but not limited to menopause hot flashes, blood clots and endometrial cancer, cataracts, improvement BMD and chol. Will consider change to  when in menopause  The patient understands the potential benefits, risks and alternatives. Cont Griffith 10/24-   Discussed risk distant recurrence, local and contralateral breast ca. Discussed finding of 3 cells in one SLN - possible "traumet". Would not change treatment plan. Vag staining - ? ? menses vs breakthrough bleeding - pelvic US and gyn eval. Check CBC, chem D estd, fsh/LH.Lt MMG/US scheduled 7/21/25.Discussed breast MRI .Refer to Dr molina - re stress.Change time of day Griffith. Discussed management anxiety.Discussed relizen.Reviewed BMD - osteopenia. Reviewed diet and exercise.  All of the patient's questions were adequately addressed and answered during today's visi. Our discussion covered a range of topics including diagnosis, prognosis, treatment options, potential risks and alternatives, as well as overall care.  She was instructed to reach out to me if she had any further questions or if there were any changes in her clinical condition. F/U 4 months   [AdvancecareDate] : 7/15/25

## 2025-07-15 NOTE — HISTORY OF PRESENT ILLNESS
[de-identified] : 52 year old AJ  referred by Dr Cruz, for  evaluation s/p RIGHT nipple sparing mastectomy, R SLNB on 9/6/24 of Right ILC ER 95%, [place] 90%, HER2- and LCIS; Presents with siter Zainab  8/9/24 ([facility]) B/L DX MMG/US: extremely dense. R 10:00 5cmfn posterior depth a new 1cm area of vague distortion corresponding to an irregular hypoechoic 9mm mass on sonogram, associated with amorphous calcifications. R 12:00 5cmfn posterior depth a new 1cm area of vague distortion corresponding to an irregular hypoechoic 11mm mass on sonogram. Distance between the 2 areas is approx 15mm. Follow Up: US-guided biopsy x2 sites right breast. BI-RADS 4, suspicious.  8/13/24 ([facility]) R US-guided biopsy x2 sites: 1. Right 12:00 5cmfn (Vision clip): Infiltrating lobular carcinoma, moderately differentiated, 11 mm in greatest dimension. ER/ME and HER2 IHC are pending. Lobular carcinoma in situ. 2. Right 10:00 5cmfn (Ring clip)- Fibrotic breast containing lobular carcinoma in situ. The results are concordant and malignant. Follow Up: surgical or oncological management.  ER 95%, [place] 95%, her 2 neg  8/19/24 ([facility]) B/L MRI: heterogeneously dense. The breast is rotated laterally. Three findings in the right breast: 1. Right breast, superior breast, projecting slightly medially (corresponds to the 12:00 (5N)(VISION)(ILC + LCIS) located on the initial mammogram and ultrasound) is clip artifact within a 18 mm area of nonmass enhancement. Biopsy showed ILC = LCIS 2. Right breast, superior breast, projecting slightly lateral (5N)(RING) there is clip artifact within a 10 mm area of nonmass enhancement. Biopsy showed LCIS. Discordant. Recommend surgical removal. 3. Right breast, superior (8N), there is 1 cm of nonmass enhancement and distortion, Sag 5-185, axial 68981-766. This area is located superior to the known LCIS and ILC. Recommend MRI biopsy. RECOMMENDATION: MR guided biopsy. BI-RADS 4B - Moderately Suspicious  8/23/24 ([facility]) R MR guided biopsy: R superior posterior 8cmfn (stoplight): Lobular carcinoma in situ (LCIS), florid and classic types. Columnar cell change. Calcifications identified in benign epithelium. Concordant. Follow Up: continued surgical management of bx-proven ILC/LCIS and LCIS/Discordant finding.  9/6/24 ([facility]) RIGHT NIPPLE SPARING MASTECTOMY: 11mm ILC with alveolar features, 4 mm from closest inked anterior margin; LCIS with associated calcifications; no lymphatic invasion is identified; at least 2 biopsy site changes; fibrocystic changes with associated calcifications; IDP RIGHT SENTINEL LYMPH NODES: One lymph node with a subcapsular lymphatic channel containing 3 isolated tumor cells RIGHT SENTINEL LYMPH NODES (0/3) SLN negative for tumor.  T1cN0 (i+) ORS 11   Menstrual Hx: has no history of hormone replacement treatment. age at menarche was 10-11. LMP 9/10/24. The cycles have been regular.   Prior pregnancies: G0 and P0 .   Fertility Treatments: No.   Birth Control Pill Use: 16-47.   Breast Feeding History: N/A GYN 9/24/24 BMD never Colonoscopy never No significant med illness age 4 adenoids Lipoma thing L 7/23   Patient denies family history of breast cancer. Denies fam hx of ovarian cancer, prostate, pancreatic. Father AML 63 [employer] genetic testing 8/16/24 negative for pathogenic mutations; VUS in BRIP1 gene.  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 Occ Etoh, not sexually active, exercises regularly [de-identified] : 1/7/25 Started tamoxifen 10/15- LMP 10/13/24.  COVID positive 1 week before Edward.Mild URI , fatigue - resolved No Etoh since 9/24 Hot flushes Acne -Went to derm who prescribed clindamycin 1% external cream- resolved Gyn 9/24 12/9/24 BMD- osteopenia 12/9/24 pelvis usL cyst stable, cyst endocerv jct Undergoing expansion Out of work Exercises almost daily c/o anxiety  7/15/25 s/p Rt NSM 9/6/24 - continues tamoxifen 20mg/d (started 10/2024) s/p Rt breast revision reconstruction 2/21/25 BMD 12/9/24 - osteopenia spine, femoral neck/hip normal US pelvis 12/9/24 - stable Lt ovarian simple cyst, cyst endocervical junction, fibroids limited RUE ROM - finished PT, rx renewed by Dr. Lerman, will see OT as well colonoscopy - needs to reschedule gyn 9/2024- wnl, annual f/u occ hot flushes - usually not bothersome, worse w/ stress, trialed Relizen w/ some relief has difficulty staying asleep but able to fall asleep quickly, frequent wakeups - may try CBTI very occ/rare b/l knee joint discomfort - worse when squatting, not bothersome LMP 10/24 - staining 7/1/25 strength-training 4 days/week no EtOH denies vaginal discharge, occ dryness  stressed lately, unemployment, having hard time finding a job difficulty sleeping

## 2025-07-15 NOTE — PHYSICAL EXAM
[Restricted in physically strenuous activity but ambulatory and able to carry out work of a light or sedentary nature] : Status 1- Restricted in physically strenuous activity but ambulatory and able to carry out work of a light or sedentary nature, e.g., light house work, office work [Normal] : affect appropriate [de-identified] : s/p Rt NSP mastectomy with TE, Lt unremarkable - 1cm shotty Rt ax LN

## 2025-07-15 NOTE — HISTORY OF PRESENT ILLNESS
[de-identified] : 52 year old AJ  referred by Dr Cruz, for  evaluation s/p RIGHT nipple sparing mastectomy, R SLNB on 9/6/24 of Right ILC ER 95%, [place] 90%, HER2- and LCIS; Presents with siter Zainab  8/9/24 ([facility]) B/L DX MMG/US: extremely dense. R 10:00 5cmfn posterior depth a new 1cm area of vague distortion corresponding to an irregular hypoechoic 9mm mass on sonogram, associated with amorphous calcifications. R 12:00 5cmfn posterior depth a new 1cm area of vague distortion corresponding to an irregular hypoechoic 11mm mass on sonogram. Distance between the 2 areas is approx 15mm. Follow Up: US-guided biopsy x2 sites right breast. BI-RADS 4, suspicious.  8/13/24 ([facility]) R US-guided biopsy x2 sites: 1. Right 12:00 5cmfn (Vision clip): Infiltrating lobular carcinoma, moderately differentiated, 11 mm in greatest dimension. ER/[place] and HER2 IHC are pending. Lobular carcinoma in situ. 2. Right 10:00 5cmfn (Ring clip)- Fibrotic breast containing lobular carcinoma in situ. The results are concordant and malignant. Follow Up: surgical or oncological management.  ER 95%, MT 95%, her 2 neg  8/19/24 ([facility]) B/L MRI: heterogeneously dense. The breast is rotated laterally. Three findings in the right breast: 1. Right breast, superior breast, projecting slightly medially (corresponds to the 12:00 (5N)(VISION)(ILC + LCIS) located on the initial mammogram and ultrasound) is clip artifact within a 18 mm area of nonmass enhancement. Biopsy showed ILC = LCIS 2. Right breast, superior breast, projecting slightly lateral (5N)(RING) there is clip artifact within a 10 mm area of nonmass enhancement. Biopsy showed LCIS. Discordant. Recommend surgical removal. 3. Right breast, superior (8N), there is 1 cm of nonmass enhancement and distortion, Sag 5-185, axial 37792-158. This area is located superior to the known LCIS and ILC. Recommend MRI biopsy. RECOMMENDATION: MR guided biopsy. BI-RADS 4B - Moderately Suspicious  8/23/24 ([facility]) R MR guided biopsy: R superior posterior 8cmfn (stoplight): Lobular carcinoma in situ (LCIS), florid and classic types. Columnar cell change. Calcifications identified in benign epithelium. Concordant. Follow Up: continued surgical management of bx-proven ILC/LCIS and LCIS/Discordant finding.  9/6/24 ([facility]) RIGHT NIPPLE SPARING MASTECTOMY: 11mm ILC with alveolar features, 4 mm from closest inked anterior margin; LCIS with associated calcifications; no lymphatic invasion is identified; at least 2 biopsy site changes; fibrocystic changes with associated calcifications; IDP RIGHT SENTINEL LYMPH NODES: One lymph node with a subcapsular lymphatic channel containing 3 isolated tumor cells RIGHT SENTINEL LYMPH NODES (0/3) SLN negative for tumor.  T1cN0 (i+) ORS 11   Menstrual Hx: has no history of hormone replacement treatment. age at menarche was 10-11. LMP 9/10/24. The cycles have been regular.   Prior pregnancies: G0 and P0 .   Fertility Treatments: No.   Birth Control Pill Use: 16-47.   Breast Feeding History: N/A GYN 9/24/24 BMD never Colonoscopy never No significant med illness age 4 adenoids Lipoma thing L 7/23   Patient denies family history of breast cancer. Denies fam hx of ovarian cancer, prostate, pancreatic. Father AML 63 [employer] genetic testing 8/16/24 negative for pathogenic mutations; VUS in BRIP1 gene.  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 Occ Etoh, not sexually active, exercises regularly [de-identified] : 1/7/25 Started tamoxifen 10/15- LMP 10/13/24.  COVID positive 1 week before Edward.Mild URI , fatigue - resolved No Etoh since 9/24 Hot flushes Acne -Went to derm who prescribed clindamycin 1% external cream- resolved Gyn 9/24 12/9/24 BMD- osteopenia 12/9/24 pelvis usL cyst stable, cyst endocerv jct Undergoing expansion Out of work Exercises almost daily c/o anxiety  7/15/25 s/p Rt NSM 9/6/24 - continues tamoxifen 20mg/d (started 10/2024) s/p Rt breast revision reconstruction 2/21/25 BMD 12/9/24 - osteopenia spine, femoral neck/hip normal US pelvis 12/9/24 - stable Lt ovarian simple cyst, cyst endocervical junction, fibroids limited RUE ROM - finished PT, rx renewed by Dr. Lerman, will see OT as well colonoscopy - needs to reschedule gyn 9/2024- wnl, annual f/u occ hot flushes - usually not bothersome, worse w/ stress, trialed Relizen w/ some relief has difficulty staying asleep but able to fall asleep quickly, frequent wakeups - may try CBTI very occ/rare b/l knee joint discomfort - worse when squatting, not bothersome LMP 10/24 - staining 7/1/25 strength-training 4 days/week no EtOH denies vaginal discharge, occ dryness  stressed lately, unemployment, having hard time finding a job difficulty sleeping

## 2025-07-15 NOTE — ASSESSMENT
[With Patient/Caregiver] : With Patient/Caregiver [Designated Health Care Proxy] : Designated Health Care Proxy [Name: ___] : Name: [unfilled] [Relationship: ___] : Relationship: [unfilled] [FreeTextEntry1] : 52 yo AJ, My risk VUS BRIP1, perimenopause S/P Rt  NSP mastectomy  with TE and SLN -11mm ILC with alveolar features,  ER/NE pos, her 2 neg, LCIS with associated calcifications; no lymphatic invasion is identified; One lymph node with a subcapsular lymphatic channel containing 3 isolated tumor cells, (0/3) SLN negative for tumor..ORS 11 Reviewed pathology and prognosis. Reviewed ORS  no chemo benefitsThe patient is a good candidate for endocrine therapy to reduce the risk of recurrence. My recommendation is TAMOXIFEN 20 mg PO daily since permenopausal    We discussed potential side effects of TAMOXIFEN, including but not limited to menopause hot flashes, blood clots and endometrial cancer, cataracts, improvement BMD and chol. Will consider change to  when in menopause  The patient understands the potential benefits, risks and alternatives. Cont Griffith 10/24-   Discussed risk distant recurrence, local and contralateral breast ca. Discussed finding of 3 cells in one SLN - possible "traumet". Would not change treatment plan. Vag staining - ? ? menses vs breakthrough bleeding - pelvic US and gyn eval. Check CBC, chem D estd, fsh/LH.Lt MMG/US scheduled 7/21/25.Discussed breast MRI .Refer to Dr molina - re stress.Change time of day Griffith. Discussed management anxiety.Discussed relizen.Reviewed BMD - osteopenia. Reviewed diet and exercise.  All of the patient's questions were adequately addressed and answered during today's visi. Our discussion covered a range of topics including diagnosis, prognosis, treatment options, potential risks and alternatives, as well as overall care.  She was instructed to reach out to me if she had any further questions or if there were any changes in her clinical condition. F/U 4 months   [AdvancecareDate] : 7/15/25

## 2025-07-15 NOTE — ASSESSMENT
[With Patient/Caregiver] : With Patient/Caregiver [Designated Health Care Proxy] : Designated Health Care Proxy [Name: ___] : Name: [unfilled] [Relationship: ___] : Relationship: [unfilled] [FreeTextEntry1] : 54 yo AJ, My risk VUS BRIP1, perimenopause S/P Rt  NSP mastectomy  with TE and SLN -11mm ILC with alveolar features,  ER/SC pos, her 2 neg, LCIS with associated calcifications; no lymphatic invasion is identified; One lymph node with a subcapsular lymphatic channel containing 3 isolated tumor cells, (0/3) SLN negative for tumor..ORS 11 Reviewed pathology and prognosis. Reviewed ORS  no chemo benefitsThe patient is a good candidate for endocrine therapy to reduce the risk of recurrence. My recommendation is TAMOXIFEN 20 mg PO daily since permenopausal    We discussed potential side effects of TAMOXIFEN, including but not limited to menopause hot flashes, blood clots and endometrial cancer, cataracts, improvement BMD and chol. Will consider change to  when in menopause  The patient understands the potential benefits, risks and alternatives. Cont Griffith 10/24-   Discussed risk distant recurrence, local and contralateral breast ca. Discussed finding of 3 cells in one SLN - possible "traumet". Would not change treatment plan. Vag staining - ? ? menses vs breakthrough bleeding - pelvic US and gyn eval. Check CBC, chem D estd, fsh/LH.Lt MMG/US scheduled 7/21/25.Discussed breast MRI .Refer to Dr molina - re stress.Change time of day Griffith. Discussed management anxiety.Discussed relizen.Reviewed BMD - osteopenia. Reviewed diet and exercise.  All of the patient's questions were adequately addressed and answered during today's visi. Our discussion covered a range of topics including diagnosis, prognosis, treatment options, potential risks and alternatives, as well as overall care.  She was instructed to reach out to me if she had any further questions or if there were any changes in her clinical condition. F/U 4 months   [AdvancecareDate] : 7/15/25

## 2025-07-15 NOTE — REVIEW OF SYSTEMS
[Diarrhea: Grade 0] : Diarrhea: Grade 0 [FreeTextEntry2] : new hot flashes x 1.5 months, acne since starting tamoxifen [Anxiety] : anxiety [Negative] : Constitutional [de-identified] : interrupted sleep

## 2025-07-15 NOTE — PHYSICAL EXAM
[Restricted in physically strenuous activity but ambulatory and able to carry out work of a light or sedentary nature] : Status 1- Restricted in physically strenuous activity but ambulatory and able to carry out work of a light or sedentary nature, e.g., light house work, office work [Normal] : affect appropriate [de-identified] : s/p Rt NSP mastectomy with TE, Lt unremarkable - 1cm shotty Rt ax LN

## 2025-07-15 NOTE — ASSESSMENT
[With Patient/Caregiver] : With Patient/Caregiver [Designated Health Care Proxy] : Designated Health Care Proxy [Name: ___] : Name: [unfilled] [Relationship: ___] : Relationship: [unfilled] [FreeTextEntry1] : 54 yo AJ, My risk VUS BRIP1, perimenopause S/P Rt  NSP mastectomy  with TE and SLN -11mm ILC with alveolar features,  ER/NV pos, her 2 neg, LCIS with associated calcifications; no lymphatic invasion is identified; One lymph node with a subcapsular lymphatic channel containing 3 isolated tumor cells, (0/3) SLN negative for tumor..ORS 11 Reviewed pathology and prognosis. Reviewed ORS  no chemo benefitsThe patient is a good candidate for endocrine therapy to reduce the risk of recurrence. My recommendation is TAMOXIFEN 20 mg PO daily since permenopausal    We discussed potential side effects of TAMOXIFEN, including but not limited to menopause hot flashes, blood clots and endometrial cancer, cataracts, improvement BMD and chol. Will consider change to  when in menopause  The patient understands the potential benefits, risks and alternatives. Cont Griffith 10/24-   Discussed risk distant recurrence, local and contralateral breast ca. Discussed finding of 3 cells in one SLN - possible "traumet". Would not change treatment plan. Vag staining - ? ? menses vs breakthrough bleeding - pelvic US and gyn eval. Check CBC, chem D estd, fsh/LH.Lt MMG/US scheduled 7/21/25.Discussed breast MRI .Refer to Dr molina - re stress.Change time of day Griffith. Discussed management anxiety.Discussed relizen.Reviewed BMD - osteopenia. Reviewed diet and exercise.  All of the patient's questions were adequately addressed and answered during today's visi. Our discussion covered a range of topics including diagnosis, prognosis, treatment options, potential risks and alternatives, as well as overall care.  She was instructed to reach out to me if she had any further questions or if there were any changes in her clinical condition. F/U 4 months   [AdvancecareDate] : 7/15/25

## 2025-07-15 NOTE — BEGINNING OF VISIT
Yes... [0] : 2) Feeling down, depressed, or hopeless: Not at all (0) [Never] : Never [Date Discussed (MM/DD/YY): ___] : Discussed: [unfilled] [With Patient/Caregiver] : with Patient/Caregiver

## 2025-07-15 NOTE — HISTORY OF PRESENT ILLNESS
[de-identified] : 52 year old AJ  referred by Dr Cruz, for  evaluation s/p RIGHT nipple sparing mastectomy, R SLNB on 9/6/24 of Right ILC ER 95%, WI 90%, HER2- and LCIS; Presents with siter Zainab  8/9/24 ([facility]) B/L DX MMG/US: extremely dense. R 10:00 5cmfn posterior depth a new 1cm area of vague distortion corresponding to an irregular hypoechoic 9mm mass on sonogram, associated with amorphous calcifications. R 12:00 5cmfn posterior depth a new 1cm area of vague distortion corresponding to an irregular hypoechoic 11mm mass on sonogram. Distance between the 2 areas is approx 15mm. Follow Up: US-guided biopsy x2 sites right breast. BI-RADS 4, suspicious.  8/13/24 ([facility]) R US-guided biopsy x2 sites: 1. Right 12:00 5cmfn (Vision clip): Infiltrating lobular carcinoma, moderately differentiated, 11 mm in greatest dimension. ER/WI and HER2 IHC are pending. Lobular carcinoma in situ. 2. Right 10:00 5cmfn (Ring clip)- Fibrotic breast containing lobular carcinoma in situ. The results are concordant and malignant. Follow Up: surgical or oncological management.  ER 95%, WI 95%, her 2 neg  8/19/24 ([facility]) B/L MRI: heterogeneously dense. The breast is rotated laterally. Three findings in the right breast: 1. Right breast, superior breast, projecting slightly medially (corresponds to the 12:00 (5N)(VISION)(ILC + LCIS) located on the initial mammogram and ultrasound) is clip artifact within a 18 mm area of nonmass enhancement. Biopsy showed ILC = LCIS 2. Right breast, superior breast, projecting slightly lateral (5N)(RING) there is clip artifact within a 10 mm area of nonmass enhancement. Biopsy showed LCIS. Discordant. Recommend surgical removal. 3. Right breast, superior (8N), there is 1 cm of nonmass enhancement and distortion, Sag 5-185, axial [id number]. This area is located superior to the known LCIS and ILC. Recommend MRI biopsy. RECOMMENDATION: MR guided biopsy. BI-RADS 4B - Moderately Suspicious  8/23/24 ([facility]) R MR guided biopsy: R superior posterior 8cmfn (stoplight): Lobular carcinoma in situ (LCIS), florid and classic types. Columnar cell change. Calcifications identified in benign epithelium. Concordant. Follow Up: continued surgical management of bx-proven ILC/LCIS and LCIS/Discordant finding.  9/6/24 ([facility]) RIGHT NIPPLE SPARING MASTECTOMY: 11mm ILC with alveolar features, 4 mm from closest inked anterior margin; LCIS with associated calcifications; no lymphatic invasion is identified; at least 2 biopsy site changes; fibrocystic changes with associated calcifications; IDP RIGHT SENTINEL LYMPH NODES: One lymph node with a subcapsular lymphatic channel containing 3 isolated tumor cells RIGHT SENTINEL LYMPH NODES (0/3) SLN negative for tumor.  T1cN0 (i+) ORS 11   Menstrual Hx: has no history of hormone replacement treatment. age at menarche was 10-11. LMP 9/10/24. The cycles have been regular.   Prior pregnancies: G0 and P0 .   Fertility Treatments: No.   Birth Control Pill Use: 16-47.   Breast Feeding History: N/A GYN 9/24/24 BMD never Colonoscopy never No significant med illness age 4 adenoids Lipoma thing L 7/23   Patient denies family history of breast cancer. Denies fam hx of ovarian cancer, prostate, pancreatic. Father AML 63 [employer] genetic testing 8/16/24 negative for pathogenic mutations; VUS in BRIP1 gene.  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 Occ Etoh, not sexually active, exercises regularly [de-identified] : 1/7/25 Started tamoxifen 10/15- LMP 10/13/24.  COVID positive 1 week before Edward.Mild URI , fatigue - resolved No Etoh since 9/24 Hot flushes Acne -Went to derm who prescribed clindamycin 1% external cream- resolved Gyn 9/24 12/9/24 BMD- osteopenia 12/9/24 pelvis usL cyst stable, cyst endocerv jct Undergoing expansion Out of work Exercises almost daily c/o anxiety  7/15/25 s/p Rt NSM 9/6/24 - continues tamoxifen 20mg/d (started 10/2024) s/p Rt breast revision reconstruction 2/21/25 BMD 12/9/24 - osteopenia spine, femoral neck/hip normal US pelvis 12/9/24 - stable Lt ovarian simple cyst, cyst endocervical junction, fibroids limited RUE ROM - finished PT, rx renewed by Dr. Lerman, will see OT as well colonoscopy - needs to reschedule gyn 9/2024- wnl, annual f/u occ hot flushes - usually not bothersome, worse w/ stress, trialed Relizen w/ some relief has difficulty staying asleep but able to fall asleep quickly, frequent wakeups - may try CBTI very occ/rare b/l knee joint discomfort - worse when squatting, not bothersome LMP 10/24 - staining 7/1/25 strength-training 4 days/week no EtOH denies vaginal discharge, occ dryness  stressed lately, unemployment, having hard time finding a job difficulty sleeping

## 2025-07-15 NOTE — PHYSICAL EXAM
[Restricted in physically strenuous activity but ambulatory and able to carry out work of a light or sedentary nature] : Status 1- Restricted in physically strenuous activity but ambulatory and able to carry out work of a light or sedentary nature, e.g., light house work, office work [Normal] : affect appropriate [de-identified] : s/p Rt NSP mastectomy with TE, Lt unremarkable - 1cm shotty Rt ax LN

## 2025-07-15 NOTE — ASSESSMENT
[With Patient/Caregiver] : With Patient/Caregiver [Designated Health Care Proxy] : Designated Health Care Proxy [Name: ___] : Name: [unfilled] [Relationship: ___] : Relationship: [unfilled] [FreeTextEntry1] : 52 yo AJ, My risk VUS BRIP1, perimenopause S/P Rt  NSP mastectomy  with TE and SLN -11mm ILC with alveolar features,  ER/MS pos, her 2 neg, LCIS with associated calcifications; no lymphatic invasion is identified; One lymph node with a subcapsular lymphatic channel containing 3 isolated tumor cells, (0/3) SLN negative for tumor..ORS 11 Reviewed pathology and prognosis. Reviewed ORS  no chemo benefitsThe patient is a good candidate for endocrine therapy to reduce the risk of recurrence. My recommendation is TAMOXIFEN 20 mg PO daily since permenopausal    We discussed potential side effects of TAMOXIFEN, including but not limited to menopause hot flashes, blood clots and endometrial cancer, cataracts, improvement BMD and chol. Will consider change to  when in menopause  The patient understands the potential benefits, risks and alternatives. Cont Griffith 10/24-   Discussed risk distant recurrence, local and contralateral breast ca. Discussed finding of 3 cells in one SLN - possible "traumet". Would not change treatment plan. Vag staining - ? ? menses vs breakthrough bleeding - pelvic US and gyn eval. Check CBC, chem D estd, fsh/LH.Lt MMG/US scheduled 7/21/25.Discussed breast MRI .Refer to Dr molina - re stress.Change time of day Griffith. Discussed management anxiety.Discussed relizen.Reviewed BMD - osteopenia. Reviewed diet and exercise.  All of the patient's questions were adequately addressed and answered during today's visi. Our discussion covered a range of topics including diagnosis, prognosis, treatment options, potential risks and alternatives, as well as overall care.  She was instructed to reach out to me if she had any further questions or if there were any changes in her clinical condition. F/U 4 months   [AdvancecareDate] : 7/15/25

## 2025-07-15 NOTE — REVIEW OF SYSTEMS
[Diarrhea: Grade 0] : Diarrhea: Grade 0 [FreeTextEntry2] : new hot flashes x 1.5 months, acne since starting tamoxifen [Anxiety] : anxiety [Negative] : Constitutional [de-identified] : interrupted sleep

## 2025-07-15 NOTE — REVIEW OF SYSTEMS
[Diarrhea: Grade 0] : Diarrhea: Grade 0 [FreeTextEntry2] : new hot flashes x 1.5 months, acne since starting tamoxifen [Anxiety] : anxiety [Negative] : Constitutional [de-identified] : interrupted sleep

## 2025-07-15 NOTE — REVIEW OF SYSTEMS
[Diarrhea: Grade 0] : Diarrhea: Grade 0 [FreeTextEntry2] : new hot flashes x 1.5 months, acne since starting tamoxifen [Anxiety] : anxiety [Negative] : Constitutional [de-identified] : interrupted sleep